# Patient Record
Sex: FEMALE | Race: WHITE | URBAN - NONMETROPOLITAN AREA
[De-identification: names, ages, dates, MRNs, and addresses within clinical notes are randomized per-mention and may not be internally consistent; named-entity substitution may affect disease eponyms.]

---

## 2019-03-19 DIAGNOSIS — D64.9 ANEMIA, UNSPECIFIED TYPE: ICD-10-CM

## 2019-03-19 DIAGNOSIS — R01.1 CARDIAC MURMUR: ICD-10-CM

## 2019-03-19 DIAGNOSIS — J44.9 CHRONIC OBSTRUCTIVE PULMONARY DISEASE, UNSPECIFIED COPD TYPE (HCC): ICD-10-CM

## 2019-03-19 DIAGNOSIS — F41.9 ANXIETY: Primary | ICD-10-CM

## 2019-03-19 DIAGNOSIS — I10 ESSENTIAL HYPERTENSION: ICD-10-CM

## 2019-03-19 DIAGNOSIS — K59.00 CONSTIPATION, UNSPECIFIED CONSTIPATION TYPE: ICD-10-CM

## 2019-03-19 DIAGNOSIS — F32.A DEPRESSION, UNSPECIFIED DEPRESSION TYPE: ICD-10-CM

## 2019-03-19 DIAGNOSIS — R52 PAIN: ICD-10-CM

## 2019-03-19 DIAGNOSIS — R56.9 SEIZURES (HCC): ICD-10-CM

## 2019-03-19 DIAGNOSIS — K21.9 GASTROESOPHAGEAL REFLUX DISEASE WITHOUT ESOPHAGITIS: ICD-10-CM

## 2019-03-19 RX ORDER — POTASSIUM CHLORIDE 750 MG/1
10 TABLET, EXTENDED RELEASE ORAL DAILY
Qty: 180 TABLET | Refills: 0
Start: 2019-03-19

## 2019-03-19 RX ORDER — LEVETIRACETAM 500 MG/1
500 TABLET ORAL 2 TIMES DAILY
Qty: 60 TABLET | Refills: 0
Start: 2019-03-19 | End: 2019-12-02

## 2019-03-19 RX ORDER — HYDROXYZINE HYDROCHLORIDE 10 MG/1
10 TABLET, FILM COATED ORAL DAILY PRN
Qty: 30 TABLET | Refills: 0
Start: 2019-03-19

## 2019-03-19 RX ORDER — IPRATROPIUM BROMIDE AND ALBUTEROL SULFATE 2.5; .5 MG/3ML; MG/3ML
1 SOLUTION RESPIRATORY (INHALATION) EVERY 6 HOURS PRN
Qty: 360 ML | Refills: 0
Start: 2019-03-19 | End: 2019-09-23

## 2019-03-19 RX ORDER — DOCUSATE SODIUM 100 MG/1
200 CAPSULE, LIQUID FILLED ORAL EVERY 8 HOURS PRN
Qty: 60 CAPSULE | Refills: 0
Start: 2019-03-19

## 2019-03-19 RX ORDER — AMLODIPINE BESYLATE 2.5 MG/1
2.5 TABLET ORAL DAILY
Qty: 90 TABLET | Refills: 0
Start: 2019-03-19 | End: 2019-07-10

## 2019-03-19 RX ORDER — SERTRALINE HYDROCHLORIDE 25 MG/1
25 TABLET, FILM COATED ORAL DAILY
Qty: 30 TABLET | Refills: 0
Start: 2019-03-19

## 2019-03-19 RX ORDER — ACETAMINOPHEN 325 MG/1
650 TABLET ORAL EVERY 6 HOURS PRN
Qty: 120 TABLET | Refills: 3
Start: 2019-03-19 | End: 2019-09-23

## 2019-03-19 RX ORDER — FERROUS SULFATE 325(65) MG
325 TABLET ORAL DAILY
Qty: 180 TABLET | Refills: 0
Start: 2019-03-19

## 2019-03-22 ENCOUNTER — OUTSIDE SERVICES (OUTPATIENT)
Dept: FAMILY MEDICINE CLINIC | Age: 84
End: 2019-03-22
Payer: MEDICARE

## 2019-03-22 DIAGNOSIS — F33.1 MODERATE EPISODE OF RECURRENT MAJOR DEPRESSIVE DISORDER (HCC): ICD-10-CM

## 2019-03-22 DIAGNOSIS — M15.9 PRIMARY OSTEOARTHRITIS INVOLVING MULTIPLE JOINTS: ICD-10-CM

## 2019-03-22 DIAGNOSIS — G40.909 SEIZURE DISORDER (HCC): ICD-10-CM

## 2019-03-22 DIAGNOSIS — J42 CHRONIC BRONCHITIS, UNSPECIFIED CHRONIC BRONCHITIS TYPE (HCC): ICD-10-CM

## 2019-03-22 DIAGNOSIS — I10 ESSENTIAL HYPERTENSION: Primary | ICD-10-CM

## 2019-03-22 DIAGNOSIS — F02.80 ALZHEIMER'S DEMENTIA WITHOUT BEHAVIORAL DISTURBANCE, UNSPECIFIED TIMING OF DEMENTIA ONSET: ICD-10-CM

## 2019-03-22 DIAGNOSIS — F41.9 ANXIETY: ICD-10-CM

## 2019-03-22 DIAGNOSIS — K21.9 GASTROESOPHAGEAL REFLUX DISEASE WITHOUT ESOPHAGITIS: ICD-10-CM

## 2019-03-22 DIAGNOSIS — K25.7 CHRONIC GASTRIC ULCER WITHOUT HEMORRHAGE AND WITHOUT PERFORATION: ICD-10-CM

## 2019-03-22 DIAGNOSIS — C67.9 MALIGNANT NEOPLASM OF URINARY BLADDER, UNSPECIFIED SITE (HCC): ICD-10-CM

## 2019-03-22 DIAGNOSIS — G30.9 ALZHEIMER'S DEMENTIA WITHOUT BEHAVIORAL DISTURBANCE, UNSPECIFIED TIMING OF DEMENTIA ONSET: ICD-10-CM

## 2019-03-22 PROCEDURE — 1123F ACP DISCUSS/DSCN MKR DOCD: CPT | Performed by: FAMILY MEDICINE

## 2019-03-22 PROCEDURE — 1101F PT FALLS ASSESS-DOCD LE1/YR: CPT | Performed by: FAMILY MEDICINE

## 2019-03-22 PROCEDURE — 99309 SBSQ NF CARE MODERATE MDM 30: CPT | Performed by: FAMILY MEDICINE

## 2019-03-22 PROCEDURE — G8484 FLU IMMUNIZE NO ADMIN: HCPCS | Performed by: FAMILY MEDICINE

## 2019-03-22 ASSESSMENT — ENCOUNTER SYMPTOMS
CONSTIPATION: 0
VOMITING: 0
BLOOD IN STOOL: 0
EYE DISCHARGE: 0
APNEA: 0
COUGH: 0
RHINORRHEA: 0
SHORTNESS OF BREATH: 0
NAUSEA: 0
DIARRHEA: 0
ABDOMINAL PAIN: 0
EYE REDNESS: 0
COLOR CHANGE: 0
SORE THROAT: 0
WHEEZING: 0
SINUS PRESSURE: 0
BACK PAIN: 0

## 2019-04-08 ENCOUNTER — OUTSIDE SERVICES (OUTPATIENT)
Dept: FAMILY MEDICINE CLINIC | Age: 84
End: 2019-04-08
Payer: MEDICARE

## 2019-04-08 DIAGNOSIS — G40.909 SEIZURE DISORDER (HCC): ICD-10-CM

## 2019-04-08 DIAGNOSIS — F33.1 MODERATE EPISODE OF RECURRENT MAJOR DEPRESSIVE DISORDER (HCC): ICD-10-CM

## 2019-04-08 DIAGNOSIS — J42 CHRONIC BRONCHITIS, UNSPECIFIED CHRONIC BRONCHITIS TYPE (HCC): ICD-10-CM

## 2019-04-08 DIAGNOSIS — M15.9 PRIMARY OSTEOARTHRITIS INVOLVING MULTIPLE JOINTS: ICD-10-CM

## 2019-04-08 DIAGNOSIS — F02.80 ALZHEIMER'S DEMENTIA WITHOUT BEHAVIORAL DISTURBANCE, UNSPECIFIED TIMING OF DEMENTIA ONSET: ICD-10-CM

## 2019-04-08 DIAGNOSIS — I10 ESSENTIAL HYPERTENSION: Primary | ICD-10-CM

## 2019-04-08 DIAGNOSIS — K21.9 GASTROESOPHAGEAL REFLUX DISEASE WITHOUT ESOPHAGITIS: ICD-10-CM

## 2019-04-08 DIAGNOSIS — K25.7 CHRONIC GASTRIC ULCER WITHOUT HEMORRHAGE AND WITHOUT PERFORATION: ICD-10-CM

## 2019-04-08 DIAGNOSIS — C67.9 MALIGNANT NEOPLASM OF URINARY BLADDER, UNSPECIFIED SITE (HCC): ICD-10-CM

## 2019-04-08 DIAGNOSIS — F41.9 ANXIETY: ICD-10-CM

## 2019-04-08 DIAGNOSIS — G30.9 ALZHEIMER'S DEMENTIA WITHOUT BEHAVIORAL DISTURBANCE, UNSPECIFIED TIMING OF DEMENTIA ONSET: ICD-10-CM

## 2019-04-08 PROCEDURE — 99309 SBSQ NF CARE MODERATE MDM 30: CPT | Performed by: NURSE PRACTITIONER

## 2019-04-08 RX ORDER — MIRTAZAPINE 15 MG/1
7.5 TABLET, FILM COATED ORAL NIGHTLY
COMMUNITY
End: 2019-12-02

## 2019-04-08 ASSESSMENT — ENCOUNTER SYMPTOMS
APNEA: 0
VOMITING: 0
COUGH: 0
COLOR CHANGE: 0
ABDOMINAL PAIN: 0
DIARRHEA: 0
EYE DISCHARGE: 0
SINUS PRESSURE: 0
ALLERGIC/IMMUNOLOGIC NEGATIVE: 1
SORE THROAT: 0
BACK PAIN: 0
WHEEZING: 0
NAUSEA: 0
EYE REDNESS: 0
RHINORRHEA: 0
BLOOD IN STOOL: 0
CONSTIPATION: 0
SHORTNESS OF BREATH: 0

## 2019-04-08 NOTE — PROGRESS NOTES
Left total    SALPINGO-OOPHORECTOMY      SMALL INTESTINE SURGERY         Family History   Problem Relation Age of Onset    Diabetes Other     Hypertension Other     Thyroid Disease Other     Cancer Mother     Diabetes type 2  Father        Social History     Tobacco Use    Smoking status: Former Smoker     Types: Cigarettes    Smokeless tobacco: Never Used   Substance Use Topics    Alcohol use: No      Current Outpatient Medications   Medication Sig Dispense Refill    mirtazapine (REMERON) 15 MG tablet Take 7.5 mg by mouth nightly      hydrOXYzine (ATARAX) 10 MG tablet Take 1 tablet by mouth daily as needed for Anxiety (Give 1 tablet orally one time a day related to anxiety) 30 tablet 0    docusate sodium (COLACE) 100 MG capsule Take 2 capsules by mouth every 8 hours as needed for Constipation 60 capsule 0    potassium chloride (KLOR-CON M) 10 MEQ extended release tablet Take 1 tablet by mouth daily 180 tablet 0    levETIRAcetam (KEPPRA) 500 MG tablet Take 1 tablet by mouth 2 times daily 60 tablet 0    ferrous sulfate 325 (65 Fe) MG tablet Take 1 tablet by mouth daily 180 tablet 0    metoprolol tartrate (LOPRESSOR) 25 MG tablet Take 0.5 tablets by mouth 2 times daily 60 tablet 0    acetaminophen (TYLENOL) 325 MG tablet Take 2 tablets by mouth every 6 hours as needed for Pain 120 tablet 3    amLODIPine (NORVASC) 2.5 MG tablet Take 1 tablet by mouth daily 90 tablet 0    ipratropium-albuterol (DUONEB) 0.5-2.5 (3) MG/3ML SOLN nebulizer solution Take 3 mLs by nebulization every 6 hours as needed for Shortness of Breath (Wheezing) 360 mL 0    sertraline (ZOLOFT) 25 MG tablet Take 1 tablet by mouth daily 30 tablet 0    omeprazole (PRILOSEC) 40 MG capsule Take 40 mg by mouth daily.  benazepril (LOTENSIN) 20 MG tablet Take 20 mg by mouth daily. No current facility-administered medications for this visit.       Allergies   Allergen Reactions    Amoxicillin     Sulfa Antibiotics Health Maintenance   Topic Date Due    DTaP/Tdap/Td vaccine (1 - Tdap) 10/10/1942    Shingles Vaccine (1 of 2) 10/10/1973    Pneumococcal 65+ years Vaccine (1 of 2 - PCV13) 10/10/1988    Potassium monitoring  08/03/2015    Creatinine monitoring  08/03/2015    Flu vaccine (Season Ended) 09/01/2019       Subjective:      Review of Systems   Constitutional: Positive for appetite change. Negative for chills, fatigue, fever and unexpected weight change. HENT: Positive for hearing loss. Negative for congestion, postnasal drip, rhinorrhea, sinus pressure, sore throat and tinnitus. Eyes: Positive for visual disturbance. Negative for discharge and redness. Respiratory: Negative for apnea, cough, shortness of breath and wheezing. Cardiovascular: Negative for chest pain, palpitations and leg swelling. Gastrointestinal: Negative for abdominal pain, blood in stool, constipation, diarrhea, nausea and vomiting. Endocrine: Negative for polyuria. Genitourinary: Negative for difficulty urinating, dysuria, frequency, hematuria and urgency. Musculoskeletal: Positive for arthralgias and gait problem. Negative for back pain, myalgias and neck stiffness. Skin: Negative. Negative for color change and rash. Allergic/Immunologic: Negative. Neurological: Negative for dizziness, tremors, seizures, syncope, light-headedness, numbness and headaches. Psychiatric/Behavioral: Positive for confusion. Negative for decreased concentration and sleep disturbance. The patient is not nervous/anxious. Objective:     Physical Exam   Constitutional: She appears well-developed and well-nourished. No distress. HENT:   Head: Normocephalic and atraumatic. Mouth/Throat: Oropharynx is clear and moist.   Eyes: Pupils are equal, round, and reactive to light. Conjunctivae and EOM are normal. Right eye exhibits no discharge. Left eye exhibits no discharge. Neck: Normal range of motion. Neck supple.    Cardiovascular: Normal rate and regular rhythm. Murmur heard. Pulmonary/Chest: Effort normal and breath sounds normal. No respiratory distress. She has no wheezes. She has no rales. Abdominal: Soft. Bowel sounds are normal. She exhibits no distension. There is no tenderness. Musculoskeletal: She exhibits no edema or tenderness. Lymphadenopathy:     She has no cervical adenopathy. Neurological: She is alert. A sensory deficit is present. Gait abnormal. Coordination normal.   Skin: Skin is warm and dry. Capillary refill takes 2 to 3 seconds. Psychiatric: She has a normal mood and affect. Judgment and thought content normal. Her speech is delayed. She is slowed. Cognition and memory are impaired. Nursing note and vitals reviewed. BP: 93/59 mmHg   Temp:97.7 °F   Pulse: 55 bpm   Weight: 114.8 Lbs   Resp: 14 Breaths/min   O2: 92 %    Assessment:       Diagnosis Orders   1. Essential hypertension     2. Chronic gastric ulcer without hemorrhage and without perforation     3. Malignant neoplasm of urinary bladder, unspecified site ([facility] [address]     4. Anxiety     5. Alzheimer's dementia without behavioral disturbance, unspecified timing of dementia onset     6. Chronic bronchitis, unspecified chronic bronchitis type ([address]     7. Gastroesophageal reflux disease without esophagitis     8. Moderate episode of recurrent major depressive disorder ([address]     9. Seizure disorder ([address]     10. Primary osteoarthritis involving multiple joints         Plan:      1. HTN- Chronic and variable. Continue Benazepril, Lopressor, and Norvasc. Continue to monitor blood pressures. 2.  GERD/PUD- Chronic and stable. Continue Omeprazole and monitor. monitor weights. Will dc supplements and add Remeron. 3. Debility secondary to Bladder cancer- F/U with Dr. Rey Hoover Continue to encourage to PO intake. 4.Anxiety with Depression- Chronic and stable. Continue Zoloft and monitor. 5. Dementia-chronic stable, safety precautions.   6. COPD- duonebs as needed. 7. Seizure disorder-Last seizure over a year ago. Continue Keppra. Had F/U with Dr. Mallika Haney as directed. 8. Osteoarthritis- stable, asymptomatic at this time. Monitor for pain.   9. Follow up monthly or prn    Electronically signed by BRENDEN Valerio CNP on 4/8/2019 at 2:39 PM

## 2019-05-10 ENCOUNTER — OUTSIDE SERVICES (OUTPATIENT)
Dept: FAMILY MEDICINE CLINIC | Age: 84
End: 2019-05-10
Payer: MEDICARE

## 2019-05-10 VITALS
TEMPERATURE: 97.5 F | DIASTOLIC BLOOD PRESSURE: 74 MMHG | OXYGEN SATURATION: 95 % | WEIGHT: 116.4 LBS | HEART RATE: 78 BPM | SYSTOLIC BLOOD PRESSURE: 155 MMHG | BODY MASS INDEX: 19.37 KG/M2 | RESPIRATION RATE: 16 BRPM

## 2019-05-10 DIAGNOSIS — F33.1 MODERATE EPISODE OF RECURRENT MAJOR DEPRESSIVE DISORDER (HCC): ICD-10-CM

## 2019-05-10 DIAGNOSIS — F02.80 ALZHEIMER'S DEMENTIA WITHOUT BEHAVIORAL DISTURBANCE, UNSPECIFIED TIMING OF DEMENTIA ONSET: ICD-10-CM

## 2019-05-10 DIAGNOSIS — G30.9 ALZHEIMER'S DEMENTIA WITHOUT BEHAVIORAL DISTURBANCE, UNSPECIFIED TIMING OF DEMENTIA ONSET: ICD-10-CM

## 2019-05-10 DIAGNOSIS — J42 CHRONIC BRONCHITIS, UNSPECIFIED CHRONIC BRONCHITIS TYPE (HCC): ICD-10-CM

## 2019-05-10 DIAGNOSIS — C67.9 MALIGNANT NEOPLASM OF URINARY BLADDER, UNSPECIFIED SITE (HCC): ICD-10-CM

## 2019-05-10 DIAGNOSIS — I10 ESSENTIAL HYPERTENSION: Primary | ICD-10-CM

## 2019-05-10 DIAGNOSIS — K25.7 CHRONIC GASTRIC ULCER WITHOUT HEMORRHAGE AND WITHOUT PERFORATION: ICD-10-CM

## 2019-05-10 DIAGNOSIS — G40.909 SEIZURE DISORDER (HCC): ICD-10-CM

## 2019-05-10 DIAGNOSIS — K21.9 GASTROESOPHAGEAL REFLUX DISEASE WITHOUT ESOPHAGITIS: ICD-10-CM

## 2019-05-10 DIAGNOSIS — F41.9 ANXIETY: ICD-10-CM

## 2019-05-10 DIAGNOSIS — M15.9 PRIMARY OSTEOARTHRITIS INVOLVING MULTIPLE JOINTS: ICD-10-CM

## 2019-05-10 PROBLEM — K59.00 CONSTIPATION: Status: ACTIVE | Noted: 2019-05-10

## 2019-05-10 PROCEDURE — 1123F ACP DISCUSS/DSCN MKR DOCD: CPT | Performed by: FAMILY MEDICINE

## 2019-05-10 PROCEDURE — 99309 SBSQ NF CARE MODERATE MDM 30: CPT | Performed by: FAMILY MEDICINE

## 2019-05-10 ASSESSMENT — ENCOUNTER SYMPTOMS
EYE REDNESS: 0
SORE THROAT: 0
SHORTNESS OF BREATH: 0
ABDOMINAL DISTENTION: 0
CONSTIPATION: 0
WHEEZING: 0
VOMITING: 0
RHINORRHEA: 0
COUGH: 0
NAUSEA: 0
DIARRHEA: 0

## 2019-05-10 NOTE — PROGRESS NOTES
Normal range of motion. She exhibits no edema or deformity. Right shoulder: She exhibits no tenderness, no bony tenderness and no pain. Left shoulder: She exhibits no tenderness, no bony tenderness and no pain. Cervical back: She exhibits no tenderness, no bony tenderness and no pain. Thoracic back: She exhibits no tenderness, no bony tenderness, no pain and no spasm. Lumbar back: She exhibits no tenderness, no bony tenderness and no pain. Lymphadenopathy:     She has no cervical adenopathy. Right: No supraclavicular adenopathy present. Left: No supraclavicular adenopathy present. Neurological: She is alert. She is disoriented. She displays no atrophy. She exhibits normal muscle tone. She displays no seizure activity. Skin: Skin is warm, dry and intact. No rash noted. No erythema. Psychiatric: She has a normal mood and affect. Her speech is delayed. Cognition and memory are impaired. Nursing note and vitals reviewed. BP (!) 155/74   Pulse 78   Temp 97.5 °F (36.4 °C)   Resp 16   Wt 116 lb 6.4 oz (52.8 kg)   SpO2 95%   BMI 19.37 kg/m²     Assessment:       Diagnosis Orders   1. Essential hypertension     2. Chronic gastric ulcer without hemorrhage and without perforation     3. Malignant neoplasm of urinary bladder, unspecified site ([address]     4. Anxiety     5. Alzheimer's dementia without behavioral disturbance, unspecified timing of dementia onset     6. Chronic bronchitis, unspecified chronic bronchitis type ([address]     7. Gastroesophageal reflux disease without esophagitis     8. Moderate episode of recurrent major depressive disorder ([address]     9. Seizure disorder ([address]     10. Primary osteoarthritis involving multiple joints         Plan:     1. HTN- Chronic and variable. Continue Benazepril, Lopressor, and Norvasc. Continue to monitor blood pressures. 2.  GERD/PUD- Chronic and stable. Continue Omeprazole and monitor.  Weights improved with recent addition of Remeron. 3. Debility secondary to Bladder cancer- F/U with Dr. Mellissa Gautam Continue to encourage to PO intake. 4.Anxiety with Depression- Chronic and stable. Continue Zoloft, Remeron, and monitor. 5. Dementia-chronic stable, safety precautions. One fall in the past month. 6. COPD- duonebs as needed. 7. Seizure disorder-Last seizure over a year ago. Continue Keppra. Had F/U with Dr. Dusty Holder as directed. 8. Osteoarthritis- stable, asymptomatic at this time. Monitor for pain.      Electronically signed by Lance Randall MD on 5/10/2019 at 4:09 PM

## 2019-06-10 ENCOUNTER — OUTSIDE SERVICES (OUTPATIENT)
Dept: FAMILY MEDICINE CLINIC | Age: 84
End: 2019-06-10
Payer: MEDICARE

## 2019-06-10 VITALS
SYSTOLIC BLOOD PRESSURE: 124 MMHG | HEART RATE: 79 BPM | RESPIRATION RATE: 14 BRPM | WEIGHT: 115 LBS | DIASTOLIC BLOOD PRESSURE: 61 MMHG | BODY MASS INDEX: 19.14 KG/M2 | OXYGEN SATURATION: 93 % | TEMPERATURE: 97 F

## 2019-06-10 DIAGNOSIS — G40.909 SEIZURE DISORDER (HCC): ICD-10-CM

## 2019-06-10 DIAGNOSIS — C67.9 MALIGNANT NEOPLASM OF URINARY BLADDER, UNSPECIFIED SITE (HCC): ICD-10-CM

## 2019-06-10 DIAGNOSIS — K25.7 CHRONIC GASTRIC ULCER WITHOUT HEMORRHAGE AND WITHOUT PERFORATION: ICD-10-CM

## 2019-06-10 DIAGNOSIS — K21.9 GASTROESOPHAGEAL REFLUX DISEASE WITHOUT ESOPHAGITIS: ICD-10-CM

## 2019-06-10 DIAGNOSIS — I10 ESSENTIAL HYPERTENSION: Primary | ICD-10-CM

## 2019-06-10 DIAGNOSIS — G30.9 ALZHEIMER'S DEMENTIA WITHOUT BEHAVIORAL DISTURBANCE, UNSPECIFIED TIMING OF DEMENTIA ONSET: ICD-10-CM

## 2019-06-10 DIAGNOSIS — K59.00 CONSTIPATION, UNSPECIFIED CONSTIPATION TYPE: ICD-10-CM

## 2019-06-10 DIAGNOSIS — F33.1 MODERATE EPISODE OF RECURRENT MAJOR DEPRESSIVE DISORDER (HCC): ICD-10-CM

## 2019-06-10 DIAGNOSIS — F02.80 ALZHEIMER'S DEMENTIA WITHOUT BEHAVIORAL DISTURBANCE, UNSPECIFIED TIMING OF DEMENTIA ONSET: ICD-10-CM

## 2019-06-10 DIAGNOSIS — F41.9 ANXIETY: ICD-10-CM

## 2019-06-10 DIAGNOSIS — M15.9 PRIMARY OSTEOARTHRITIS INVOLVING MULTIPLE JOINTS: ICD-10-CM

## 2019-06-10 DIAGNOSIS — J42 CHRONIC BRONCHITIS, UNSPECIFIED CHRONIC BRONCHITIS TYPE (HCC): ICD-10-CM

## 2019-06-10 PROCEDURE — 99309 SBSQ NF CARE MODERATE MDM 30: CPT | Performed by: NURSE PRACTITIONER

## 2019-06-10 RX ORDER — ACETAMINOPHEN 325 MG/1
650 TABLET ORAL DAILY
COMMUNITY

## 2019-06-10 ASSESSMENT — ENCOUNTER SYMPTOMS
SORE THROAT: 0
EYE REDNESS: 0
NAUSEA: 0
RHINORRHEA: 0
COUGH: 0
WHEEZING: 0
SHORTNESS OF BREATH: 0
DIARRHEA: 0
VOMITING: 0
CONSTIPATION: 0

## 2019-06-10 NOTE — PROGRESS NOTES
Randell Morales is a 80 y.o. female who presents today for her medical conditions/complaints as noted below. Chief Complaint   Patient presents with    1 Month Follow-Up     follow up on chronic health conditions           HPI:     Marvin Escalante is seen in her room. She denies any complaints. She is up sitting in a bedside chair. She had a UA on 5/21 but it did not grow anything. No recent falls. Weight has been overall stable. She did have a room move in the last month and has done well with this.      Past Medical History:   Diagnosis Date    Anemia, unspecified     Anxiety disorder, unspecified     Cardiac murmur, unspecified     Chronic obstructive pulmonary disease (COPD) (HCC)     Difficulty in walking, not elsewhere classified     Essential (primary) hypertension     Gastro-esophageal reflux disease without esophagitis     Hypertension     Major depressive disorder, recurrent ([address]     Malignant neoplasm of bladder (HCC)     Muscle weakness (generalized)     Other myocardial infarction type ([address]     Other seizures ([address]     Personal history of peptic ulcer disease     Pneumonia, unspecified organism     Primary generalized (osteo)arthritis     Retention of urine, unspecified     Stomach ulcer     Urinary tract infection, site not specified       Past Surgical History:   Procedure Laterality Date    APPENDECTOMY      BLADDER TUMOR EXCISION      Bladder tumor resection    COLONOSCOPY      HYSTERECTOMY      MIDDLE EAR SURGERY Left     PAROTIDECTOMY  5/15/10    Left total    SALPINGO-OOPHORECTOMY      SMALL INTESTINE SURGERY         Family History   Problem Relation Age of Onset    Diabetes Other     Hypertension Other     Thyroid Disease Other     Cancer Mother     Diabetes type 2  Father        Social History     Tobacco Use    Smoking status: Former Smoker     Types: Cigarettes    Smokeless tobacco: Never Used   Substance Use Topics    Alcohol use: No      Current Outpatient Constitutional: Negative for chills, fatigue, fever and unexpected weight change. HENT: Positive for hearing loss. Negative for congestion, rhinorrhea and sore throat. Eyes: Negative for redness and visual disturbance. Respiratory: Negative for cough, shortness of breath and wheezing. Cardiovascular: Negative for chest pain and leg swelling. Gastrointestinal: Negative for constipation, diarrhea, nausea and vomiting. Endocrine: Negative for polydipsia and polyuria. Genitourinary: Negative for dysuria, frequency and hematuria. Musculoskeletal: Positive for gait problem. Negative for arthralgias and myalgias. Skin: Negative for rash and wound. Allergic/Immunologic: Negative for environmental allergies and immunocompromised state. Neurological: Positive for weakness. Negative for dizziness, light-headedness and headaches. Hematological: Does not bruise/bleed easily. Psychiatric/Behavioral: Positive for confusion. Negative for dysphoric mood and sleep disturbance. The patient is nervous/anxious. Objective:     Physical Exam   Constitutional: She appears well-developed and well-nourished. No distress. HENT:   Head: Normocephalic and atraumatic. Right Ear: External ear normal.   Left Ear: External ear normal.   Nose: Nose normal.   Mouth/Throat: Oropharynx is clear and moist and mucous membranes are normal.   Eyes: Conjunctivae and EOM are normal. Right eye exhibits no discharge. Left eye exhibits no discharge. No scleral icterus. Neck: Neck supple. No JVD present. No thyromegaly present. Cardiovascular: Normal rate, regular rhythm, normal heart sounds and intact distal pulses. Pulmonary/Chest: Effort normal. No stridor. No respiratory distress. She has decreased breath sounds. She has no wheezes. She has no rales. Abdominal: Soft. Bowel sounds are normal. She exhibits no distension. There is no tenderness. Musculoskeletal: Normal range of motion.  She exhibits no edema or deformity. Right shoulder: She exhibits no tenderness, no bony tenderness and no pain. Left shoulder: She exhibits no tenderness, no bony tenderness and no pain. Cervical back: She exhibits no tenderness, no bony tenderness and no pain. Thoracic back: She exhibits no tenderness, no bony tenderness, no pain and no spasm. Lumbar back: She exhibits no tenderness, no bony tenderness and no pain. Lymphadenopathy:     She has no cervical adenopathy. Right: No supraclavicular adenopathy present. Left: No supraclavicular adenopathy present. Neurological: She is alert. She is disoriented. She displays no atrophy. She exhibits normal muscle tone. She displays no seizure activity. Skin: Skin is warm, dry and intact. No rash noted. No erythema. Psychiatric: Her behavior is normal. Her mood appears anxious. Her speech is delayed. Cognition and memory are impaired. Nursing note and vitals reviewed. /61   Pulse 79   Temp 97 °F (36.1 °C)   Resp 14   Wt 115 lb (52.2 kg)   SpO2 93%   BMI 19.14 kg/m²     Assessment:       Diagnosis Orders   1. Essential hypertension     2. Chronic gastric ulcer without hemorrhage and without perforation     3. Malignant neoplasm of urinary bladder, unspecified site ([address]     4. Anxiety     5. Alzheimer's dementia without behavioral disturbance, unspecified timing of dementia onset     6. Chronic bronchitis, unspecified chronic bronchitis type ([address]     7. Gastroesophageal reflux disease without esophagitis     8. Moderate episode of recurrent major depressive disorder ([address]     9. Seizure disorder ([address]     10. Primary osteoarthritis involving multiple joints     11. Constipation, unspecified constipation type         Plan:     1. HTN- Chronic and variable. Continue Benazepril, Lopressor, and Norvasc. Continue to monitor blood pressures. 2.  GERD/PUD- Chronic and stable. Continue Omeprazole and monitor.  Weights remain stable in

## 2019-07-10 ENCOUNTER — OUTSIDE SERVICES (OUTPATIENT)
Dept: FAMILY MEDICINE CLINIC | Age: 84
End: 2019-07-10
Payer: MEDICARE

## 2019-07-10 VITALS
DIASTOLIC BLOOD PRESSURE: 56 MMHG | RESPIRATION RATE: 16 BRPM | OXYGEN SATURATION: 96 % | WEIGHT: 114.5 LBS | TEMPERATURE: 98 F | SYSTOLIC BLOOD PRESSURE: 107 MMHG | BODY MASS INDEX: 19.05 KG/M2 | HEART RATE: 58 BPM

## 2019-07-10 DIAGNOSIS — F41.9 ANXIETY: ICD-10-CM

## 2019-07-10 DIAGNOSIS — K25.7 CHRONIC GASTRIC ULCER WITHOUT HEMORRHAGE AND WITHOUT PERFORATION: ICD-10-CM

## 2019-07-10 DIAGNOSIS — J42 CHRONIC BRONCHITIS, UNSPECIFIED CHRONIC BRONCHITIS TYPE (HCC): ICD-10-CM

## 2019-07-10 DIAGNOSIS — M15.9 PRIMARY OSTEOARTHRITIS INVOLVING MULTIPLE JOINTS: ICD-10-CM

## 2019-07-10 DIAGNOSIS — I10 ESSENTIAL HYPERTENSION: Primary | ICD-10-CM

## 2019-07-10 DIAGNOSIS — F02.80 ALZHEIMER'S DEMENTIA WITHOUT BEHAVIORAL DISTURBANCE, UNSPECIFIED TIMING OF DEMENTIA ONSET: ICD-10-CM

## 2019-07-10 DIAGNOSIS — G40.909 SEIZURE DISORDER (HCC): ICD-10-CM

## 2019-07-10 DIAGNOSIS — G30.9 ALZHEIMER'S DEMENTIA WITHOUT BEHAVIORAL DISTURBANCE, UNSPECIFIED TIMING OF DEMENTIA ONSET: ICD-10-CM

## 2019-07-10 DIAGNOSIS — C67.9 MALIGNANT NEOPLASM OF URINARY BLADDER, UNSPECIFIED SITE (HCC): ICD-10-CM

## 2019-07-10 DIAGNOSIS — K21.9 GASTROESOPHAGEAL REFLUX DISEASE WITHOUT ESOPHAGITIS: ICD-10-CM

## 2019-07-10 PROBLEM — D64.9 ANEMIA: Status: ACTIVE | Noted: 2019-07-10

## 2019-07-10 PROCEDURE — 99309 SBSQ NF CARE MODERATE MDM 30: CPT | Performed by: NURSE PRACTITIONER

## 2019-07-10 ASSESSMENT — ENCOUNTER SYMPTOMS
CONSTIPATION: 0
COUGH: 0
VOMITING: 0
RHINORRHEA: 0
SHORTNESS OF BREATH: 0
WHEEZING: 0
EYE REDNESS: 0
NAUSEA: 0
SORE THROAT: 0
DIARRHEA: 0

## 2019-07-10 NOTE — PROGRESS NOTES
tablet Take 650 mg by mouth 2 times daily      mirtazapine (REMERON) 15 MG tablet Take 7.5 mg by mouth nightly      hydrOXYzine (ATARAX) 10 MG tablet Take 1 tablet by mouth daily as needed for Anxiety (Give 1 tablet orally one time a day related to anxiety) 30 tablet 0    docusate sodium (COLACE) 100 MG capsule Take 2 capsules by mouth every 8 hours as needed for Constipation 60 capsule 0    potassium chloride (KLOR-CON M) 10 MEQ extended release tablet Take 1 tablet by mouth daily 180 tablet 0    levETIRAcetam (KEPPRA) 500 MG tablet Take 1 tablet by mouth 2 times daily 60 tablet 0    ferrous sulfate 325 (65 Fe) MG tablet Take 1 tablet by mouth daily 180 tablet 0    metoprolol tartrate (LOPRESSOR) 25 MG tablet Take 0.5 tablets by mouth 2 times daily 60 tablet 0    acetaminophen (TYLENOL) 325 MG tablet Take 2 tablets by mouth every 6 hours as needed for Pain 120 tablet 3    ipratropium-albuterol (DUONEB) 0.5-2.5 (3) MG/3ML SOLN nebulizer solution Take 3 mLs by nebulization every 6 hours as needed for Shortness of Breath (Wheezing) 360 mL 0    sertraline (ZOLOFT) 25 MG tablet Take 1 tablet by mouth daily 30 tablet 0    omeprazole (PRILOSEC) 40 MG capsule Take 40 mg by mouth daily.  benazepril (LOTENSIN) 20 MG tablet Take 20 mg by mouth daily. No current facility-administered medications for this visit. Allergies   Allergen Reactions    Amoxicillin     Sulfa Antibiotics        Health Maintenance   Topic Date Due    DTaP/Tdap/Td vaccine (1 - Tdap) 10/10/1942    Shingles Vaccine (1 of 2) 10/10/1973    Pneumococcal 65+ years Vaccine (1 of 2 - PCV13) 10/10/1988    Potassium monitoring  08/03/2015    Creatinine monitoring  08/03/2015    Flu vaccine (1) 09/01/2019       Subjective:      Review of Systems   Constitutional: Negative for chills, fatigue, fever and unexpected weight change. HENT: Positive for hearing loss. Negative for congestion, rhinorrhea and sore throat.     Eyes: Negative for redness and visual disturbance. Respiratory: Negative for cough, shortness of breath and wheezing. Cardiovascular: Negative for chest pain and leg swelling. Gastrointestinal: Negative for constipation, diarrhea, nausea and vomiting. Endocrine: Negative for polydipsia and polyuria. Genitourinary: Negative for dysuria, frequency and hematuria. Musculoskeletal: Positive for gait problem. Negative for arthralgias and myalgias. Skin: Negative for rash and wound. Allergic/Immunologic: Negative for environmental allergies and immunocompromised state. Neurological: Positive for weakness. Negative for dizziness, light-headedness and headaches. Hematological: Does not bruise/bleed easily. Psychiatric/Behavioral: Positive for confusion. Negative for dysphoric mood and sleep disturbance. The patient is nervous/anxious. Objective:     Physical Exam   Constitutional: She appears well-developed and well-nourished. No distress. HENT:   Head: Normocephalic and atraumatic. Right Ear: External ear normal.   Left Ear: External ear normal.   Nose: Nose normal.   Mouth/Throat: Oropharynx is clear and moist and mucous membranes are normal.   Eyes: Conjunctivae and EOM are normal. Right eye exhibits no discharge. Left eye exhibits no discharge. No scleral icterus. Neck: Neck supple. No JVD present. No thyromegaly present. Cardiovascular: Normal rate, regular rhythm, normal heart sounds and intact distal pulses. Pulmonary/Chest: Effort normal. No stridor. No respiratory distress. She has decreased breath sounds. She has no wheezes. She has no rales. Abdominal: Soft. Bowel sounds are normal. She exhibits no distension. There is no tenderness. Musculoskeletal: Normal range of motion. She exhibits no edema or deformity. Right shoulder: She exhibits no tenderness, no bony tenderness and no pain. Left shoulder: She exhibits no tenderness, no bony tenderness and no pain. Cervical back: She exhibits no tenderness, no bony tenderness and no pain. Thoracic back: She exhibits no tenderness, no bony tenderness, no pain and no spasm. Lumbar back: She exhibits no tenderness, no bony tenderness and no pain. Lymphadenopathy:     She has no cervical adenopathy. Right: No supraclavicular adenopathy present. Left: No supraclavicular adenopathy present. Neurological: She is alert. She is disoriented. She displays no atrophy. She exhibits normal muscle tone. She displays no seizure activity. Skin: Skin is warm, dry and intact. No rash noted. No erythema. Psychiatric: Her behavior is normal. Her mood appears anxious. Her speech is delayed. Cognition and memory are impaired. Nursing note and vitals reviewed. BP (!) 107/56   Pulse 58   Temp 98 °F (36.7 °C)   Resp 16   Wt 114 lb 8 oz (51.9 kg)   SpO2 96%   BMI 19.05 kg/m²     Assessment:       Diagnosis Orders   1. Essential hypertension     2. Chronic gastric ulcer without hemorrhage and without perforation     3. Malignant neoplasm of urinary bladder, unspecified site ([address]     4. Anxiety     5. Alzheimer's dementia without behavioral disturbance, unspecified timing of dementia onset     6. Chronic bronchitis, unspecified chronic bronchitis type ([address]     7. Gastroesophageal reflux disease without esophagitis     8. Seizure disorder ([address]     9. Primary osteoarthritis involving multiple joints         Plan:     1. HTN- Chronic and variable. Continue Benazepril and Lopressor. She has had lower blood pressures and will dc Norvasc and add parameters for Lopressor. 2.  GERD/PUD- Chronic and stable. Continue Omeprazole and monitor. Weights remain stable in the past month. 3. Debility secondary to Bladder cancer- F/U with Dr. Kavin Edwards Continue to encourage to PO intake. 4.Anxiety with Depression- Chronic and stable. Continue Zoloft, Remeron, and monitor. 5. Dementia-chronic stable, safety precautions.

## 2019-08-15 ENCOUNTER — OUTSIDE SERVICES (OUTPATIENT)
Dept: FAMILY MEDICINE CLINIC | Age: 84
End: 2019-08-15

## 2019-08-15 VITALS
TEMPERATURE: 97.4 F | OXYGEN SATURATION: 97 % | DIASTOLIC BLOOD PRESSURE: 57 MMHG | BODY MASS INDEX: 19.19 KG/M2 | RESPIRATION RATE: 16 BRPM | SYSTOLIC BLOOD PRESSURE: 122 MMHG | WEIGHT: 115.3 LBS | HEART RATE: 57 BPM

## 2019-08-15 DIAGNOSIS — F33.1 MODERATE EPISODE OF RECURRENT MAJOR DEPRESSIVE DISORDER (HCC): ICD-10-CM

## 2019-08-15 DIAGNOSIS — M15.9 PRIMARY OSTEOARTHRITIS INVOLVING MULTIPLE JOINTS: ICD-10-CM

## 2019-08-15 DIAGNOSIS — K25.7 CHRONIC GASTRIC ULCER WITHOUT HEMORRHAGE AND WITHOUT PERFORATION: ICD-10-CM

## 2019-08-15 DIAGNOSIS — F02.80 ALZHEIMER'S DEMENTIA WITHOUT BEHAVIORAL DISTURBANCE, UNSPECIFIED TIMING OF DEMENTIA ONSET: ICD-10-CM

## 2019-08-15 DIAGNOSIS — D64.9 ANEMIA, UNSPECIFIED TYPE: ICD-10-CM

## 2019-08-15 DIAGNOSIS — C67.9 MALIGNANT NEOPLASM OF URINARY BLADDER, UNSPECIFIED SITE (HCC): ICD-10-CM

## 2019-08-15 DIAGNOSIS — G30.9 ALZHEIMER'S DEMENTIA WITHOUT BEHAVIORAL DISTURBANCE, UNSPECIFIED TIMING OF DEMENTIA ONSET: ICD-10-CM

## 2019-08-15 DIAGNOSIS — G40.909 SEIZURE DISORDER (HCC): ICD-10-CM

## 2019-08-15 DIAGNOSIS — J42 CHRONIC BRONCHITIS, UNSPECIFIED CHRONIC BRONCHITIS TYPE (HCC): ICD-10-CM

## 2019-08-15 DIAGNOSIS — I10 ESSENTIAL HYPERTENSION: Primary | ICD-10-CM

## 2019-08-15 ASSESSMENT — ENCOUNTER SYMPTOMS
WHEEZING: 0
DIARRHEA: 0
EYE REDNESS: 0
VOMITING: 0
CONSTIPATION: 0
SHORTNESS OF BREATH: 0
NAUSEA: 0
SORE THROAT: 0
COUGH: 0
RHINORRHEA: 0

## 2019-08-15 NOTE — PROGRESS NOTES
Chapis Veliz is a 80 y.o. female who presents today for her medical conditions/complaints as noted below. Chief Complaint   Patient presents with    1 Month Follow-Up     for chronic health conditions           HPI:     Deepthi Lemon is seen and examined today for her monthly visit. She is sitting in her recliner watching television. She is very pleasant and smiling. She denies needs at this time. Nursing staff have no new concerns.        Past Medical History:   Diagnosis Date    Anemia, unspecified     Anxiety disorder, unspecified     Cardiac murmur, unspecified     Chronic obstructive pulmonary disease (COPD) (HCC)     Difficulty in walking, not elsewhere classified     Essential (primary) hypertension     Gastro-esophageal reflux disease without esophagitis     Hypertension     Major depressive disorder, recurrent ([address]     Malignant neoplasm of bladder (HCC)     Muscle weakness (generalized)     Other myocardial infarction type ([address]     Other seizures ([address]     Personal history of peptic ulcer disease     Pneumonia, unspecified organism     Primary generalized (osteo)arthritis     Retention of urine, unspecified     Stomach ulcer     Urinary tract infection, site not specified       Past Surgical History:   Procedure Laterality Date    APPENDECTOMY      BLADDER TUMOR EXCISION      Bladder tumor resection    COLONOSCOPY      HYSTERECTOMY      MIDDLE EAR SURGERY Left     PAROTIDECTOMY  5/15/10    Left total    SALPINGO-OOPHORECTOMY      SMALL INTESTINE SURGERY         Family History   Problem Relation Age of Onset    Diabetes Other     Hypertension Other     Thyroid Disease Other     Cancer Mother     Diabetes type 2  Father        Social History     Tobacco Use    Smoking status: Former Smoker     Types: Cigarettes    Smokeless tobacco: Never Used   Substance Use Topics    Alcohol use: No      Current Outpatient Medications   Medication Sig Dispense Refill    acetaminophen Dementia-chronic stable, safety precautions. No falls. 6. COPD- duonebs as needed. 7. Seizure disorder-Last seizure over a year ago. Continue Keppra. Follows with Dr. Jaime Walker. Keppra level stable. 8. Osteoarthritis- stable, asymptomatic at this time. Monitor for pain.      Electronically signed by BRENDEN Patton CNP on 8/15/2019 at 4:28 PM

## 2019-09-23 ENCOUNTER — OUTSIDE SERVICES (OUTPATIENT)
Dept: FAMILY MEDICINE CLINIC | Age: 84
End: 2019-09-23
Payer: MEDICARE

## 2019-09-23 VITALS
WEIGHT: 113.2 LBS | TEMPERATURE: 97.5 F | DIASTOLIC BLOOD PRESSURE: 58 MMHG | SYSTOLIC BLOOD PRESSURE: 125 MMHG | OXYGEN SATURATION: 93 % | HEART RATE: 79 BPM | BODY MASS INDEX: 18.84 KG/M2 | RESPIRATION RATE: 16 BRPM

## 2019-09-23 DIAGNOSIS — G40.909 SEIZURE DISORDER (HCC): ICD-10-CM

## 2019-09-23 DIAGNOSIS — M15.9 PRIMARY OSTEOARTHRITIS INVOLVING MULTIPLE JOINTS: ICD-10-CM

## 2019-09-23 DIAGNOSIS — G30.9 ALZHEIMER'S DEMENTIA WITHOUT BEHAVIORAL DISTURBANCE, UNSPECIFIED TIMING OF DEMENTIA ONSET: ICD-10-CM

## 2019-09-23 DIAGNOSIS — K25.7 CHRONIC GASTRIC ULCER WITHOUT HEMORRHAGE AND WITHOUT PERFORATION: ICD-10-CM

## 2019-09-23 DIAGNOSIS — C67.9 MALIGNANT NEOPLASM OF URINARY BLADDER, UNSPECIFIED SITE (HCC): ICD-10-CM

## 2019-09-23 DIAGNOSIS — F02.80 ALZHEIMER'S DEMENTIA WITHOUT BEHAVIORAL DISTURBANCE, UNSPECIFIED TIMING OF DEMENTIA ONSET: ICD-10-CM

## 2019-09-23 DIAGNOSIS — F33.1 MODERATE EPISODE OF RECURRENT MAJOR DEPRESSIVE DISORDER (HCC): ICD-10-CM

## 2019-09-23 DIAGNOSIS — D64.9 ANEMIA, UNSPECIFIED TYPE: ICD-10-CM

## 2019-09-23 DIAGNOSIS — I10 ESSENTIAL HYPERTENSION: Primary | ICD-10-CM

## 2019-09-23 PROCEDURE — 99309 SBSQ NF CARE MODERATE MDM 30: CPT | Performed by: NURSE PRACTITIONER

## 2019-09-23 ASSESSMENT — ENCOUNTER SYMPTOMS
RHINORRHEA: 0
SORE THROAT: 0
EYE REDNESS: 0
VOMITING: 0
CONSTIPATION: 0
DIARRHEA: 0
NAUSEA: 0
COUGH: 0
SHORTNESS OF BREATH: 0
WHEEZING: 0

## 2019-09-23 NOTE — PROGRESS NOTES
acetaminophen (TYLENOL) 325 MG tablet Take 650 mg by mouth 2 times daily      mirtazapine (REMERON) 15 MG tablet Take 7.5 mg by mouth nightly      hydrOXYzine (ATARAX) 10 MG tablet Take 1 tablet by mouth daily as needed for Anxiety (Give 1 tablet orally one time a day related to anxiety) 30 tablet 0    docusate sodium (COLACE) 100 MG capsule Take 2 capsules by mouth every 8 hours as needed for Constipation 60 capsule 0    potassium chloride (KLOR-CON M) 10 MEQ extended release tablet Take 1 tablet by mouth daily 180 tablet 0    levETIRAcetam (KEPPRA) 500 MG tablet Take 1 tablet by mouth 2 times daily 60 tablet 0    ferrous sulfate 325 (65 Fe) MG tablet Take 1 tablet by mouth daily 180 tablet 0    metoprolol tartrate (LOPRESSOR) 25 MG tablet Take 0.5 tablets by mouth 2 times daily 60 tablet 0    sertraline (ZOLOFT) 25 MG tablet Take 1 tablet by mouth daily 30 tablet 0    omeprazole (PRILOSEC) 40 MG capsule Take 40 mg by mouth daily.  benazepril (LOTENSIN) 20 MG tablet Take 20 mg by mouth daily. No current facility-administered medications for this visit. Allergies   Allergen Reactions    Amoxicillin     Sulfa Antibiotics        Health Maintenance   Topic Date Due    DTaP/Tdap/Td vaccine (1 - Tdap) 10/10/1942    Shingles Vaccine (1 of 2) 10/10/1973    Pneumococcal 65+ years Vaccine (1 of 2 - PCV13) 10/10/1988    Potassium monitoring  08/03/2015    Creatinine monitoring  08/03/2015    Flu vaccine (1) 09/01/2019       Subjective:      Review of Systems   Constitutional: Negative for chills, fatigue, fever and unexpected weight change. HENT: Positive for hearing loss. Negative for congestion, rhinorrhea and sore throat. Eyes: Negative for redness and visual disturbance. Respiratory: Negative for cough, shortness of breath and wheezing. Cardiovascular: Negative for chest pain and leg swelling. Gastrointestinal: Negative for constipation, diarrhea, nausea and vomiting. BRENDEN - CNP on 9/23/2019 at 11:58 AM

## 2019-11-01 ENCOUNTER — OUTSIDE SERVICES (OUTPATIENT)
Dept: FAMILY MEDICINE CLINIC | Age: 84
End: 2019-11-01
Payer: MEDICARE

## 2019-11-01 VITALS
RESPIRATION RATE: 16 BRPM | TEMPERATURE: 97.7 F | HEART RATE: 68 BPM | BODY MASS INDEX: 18.99 KG/M2 | DIASTOLIC BLOOD PRESSURE: 63 MMHG | SYSTOLIC BLOOD PRESSURE: 146 MMHG | OXYGEN SATURATION: 96 % | WEIGHT: 114.1 LBS

## 2019-11-01 DIAGNOSIS — C67.9 MALIGNANT NEOPLASM OF URINARY BLADDER, UNSPECIFIED SITE (HCC): ICD-10-CM

## 2019-11-01 DIAGNOSIS — F33.1 MODERATE EPISODE OF RECURRENT MAJOR DEPRESSIVE DISORDER (HCC): ICD-10-CM

## 2019-11-01 DIAGNOSIS — G30.9 ALZHEIMER'S DEMENTIA WITHOUT BEHAVIORAL DISTURBANCE, UNSPECIFIED TIMING OF DEMENTIA ONSET: ICD-10-CM

## 2019-11-01 DIAGNOSIS — K25.7 CHRONIC GASTRIC ULCER WITHOUT HEMORRHAGE AND WITHOUT PERFORATION: ICD-10-CM

## 2019-11-01 DIAGNOSIS — F02.80 ALZHEIMER'S DEMENTIA WITHOUT BEHAVIORAL DISTURBANCE, UNSPECIFIED TIMING OF DEMENTIA ONSET: ICD-10-CM

## 2019-11-01 DIAGNOSIS — M15.9 PRIMARY OSTEOARTHRITIS INVOLVING MULTIPLE JOINTS: ICD-10-CM

## 2019-11-01 DIAGNOSIS — D64.9 ANEMIA, UNSPECIFIED TYPE: ICD-10-CM

## 2019-11-01 DIAGNOSIS — G40.909 SEIZURE DISORDER (HCC): ICD-10-CM

## 2019-11-01 DIAGNOSIS — I10 ESSENTIAL HYPERTENSION: Primary | ICD-10-CM

## 2019-11-01 PROCEDURE — 99309 SBSQ NF CARE MODERATE MDM 30: CPT | Performed by: FAMILY MEDICINE

## 2019-11-01 ASSESSMENT — ENCOUNTER SYMPTOMS
DIARRHEA: 0
RHINORRHEA: 0
WHEEZING: 0
CONSTIPATION: 0
VOMITING: 0
SORE THROAT: 0
SHORTNESS OF BREATH: 0
COUGH: 0
EYE REDNESS: 0
NAUSEA: 0

## 2019-12-02 ENCOUNTER — OUTSIDE SERVICES (OUTPATIENT)
Dept: FAMILY MEDICINE CLINIC | Age: 84
End: 2019-12-02
Payer: MEDICARE

## 2019-12-02 VITALS
OXYGEN SATURATION: 91 % | TEMPERATURE: 97.6 F | BODY MASS INDEX: 18.64 KG/M2 | RESPIRATION RATE: 16 BRPM | HEART RATE: 62 BPM | DIASTOLIC BLOOD PRESSURE: 66 MMHG | SYSTOLIC BLOOD PRESSURE: 148 MMHG | WEIGHT: 112 LBS

## 2019-12-02 DIAGNOSIS — D64.9 ANEMIA, UNSPECIFIED TYPE: ICD-10-CM

## 2019-12-02 DIAGNOSIS — F33.1 MODERATE EPISODE OF RECURRENT MAJOR DEPRESSIVE DISORDER (HCC): ICD-10-CM

## 2019-12-02 DIAGNOSIS — G30.9 ALZHEIMER'S DEMENTIA WITHOUT BEHAVIORAL DISTURBANCE, UNSPECIFIED TIMING OF DEMENTIA ONSET: ICD-10-CM

## 2019-12-02 DIAGNOSIS — I10 ESSENTIAL HYPERTENSION: Primary | ICD-10-CM

## 2019-12-02 DIAGNOSIS — F02.80 ALZHEIMER'S DEMENTIA WITHOUT BEHAVIORAL DISTURBANCE, UNSPECIFIED TIMING OF DEMENTIA ONSET: ICD-10-CM

## 2019-12-02 DIAGNOSIS — C67.9 MALIGNANT NEOPLASM OF URINARY BLADDER, UNSPECIFIED SITE (HCC): ICD-10-CM

## 2019-12-02 DIAGNOSIS — G40.909 SEIZURE DISORDER (HCC): ICD-10-CM

## 2019-12-02 DIAGNOSIS — M15.9 PRIMARY OSTEOARTHRITIS INVOLVING MULTIPLE JOINTS: ICD-10-CM

## 2019-12-02 DIAGNOSIS — K25.7 CHRONIC GASTRIC ULCER WITHOUT HEMORRHAGE AND WITHOUT PERFORATION: ICD-10-CM

## 2019-12-02 PROCEDURE — 99309 SBSQ NF CARE MODERATE MDM 30: CPT | Performed by: NURSE PRACTITIONER

## 2019-12-02 RX ORDER — LEVETIRACETAM 500 MG/1
1000 TABLET, EXTENDED RELEASE ORAL DAILY
COMMUNITY

## 2019-12-02 RX ORDER — METOPROLOL SUCCINATE 25 MG/1
25 TABLET, EXTENDED RELEASE ORAL DAILY
COMMUNITY

## 2019-12-02 RX ORDER — GUAIFENESIN/DEXTROMETHORPHAN 100-10MG/5
10 SYRUP ORAL EVERY 4 HOURS PRN
COMMUNITY

## 2019-12-02 ASSESSMENT — ENCOUNTER SYMPTOMS: BACK PAIN: 0

## 2020-01-01 ENCOUNTER — OUTSIDE SERVICES (OUTPATIENT)
Dept: FAMILY MEDICINE CLINIC | Age: 85
End: 2020-01-01
Payer: MEDICARE

## 2020-01-01 VITALS
OXYGEN SATURATION: 96 % | BODY MASS INDEX: 18.54 KG/M2 | WEIGHT: 111.4 LBS | SYSTOLIC BLOOD PRESSURE: 176 MMHG | RESPIRATION RATE: 16 BRPM | TEMPERATURE: 97.2 F | HEART RATE: 90 BPM | DIASTOLIC BLOOD PRESSURE: 93 MMHG

## 2020-01-01 VITALS
HEART RATE: 91 BPM | DIASTOLIC BLOOD PRESSURE: 83 MMHG | SYSTOLIC BLOOD PRESSURE: 126 MMHG | RESPIRATION RATE: 16 BRPM | OXYGEN SATURATION: 95 % | TEMPERATURE: 97.6 F | WEIGHT: 116.8 LBS | BODY MASS INDEX: 19.44 KG/M2

## 2020-01-01 VITALS
TEMPERATURE: 98.3 F | HEART RATE: 65 BPM | RESPIRATION RATE: 16 BRPM | DIASTOLIC BLOOD PRESSURE: 58 MMHG | BODY MASS INDEX: 18.47 KG/M2 | WEIGHT: 111 LBS | OXYGEN SATURATION: 96 % | SYSTOLIC BLOOD PRESSURE: 123 MMHG

## 2020-01-01 VITALS
BODY MASS INDEX: 19 KG/M2 | DIASTOLIC BLOOD PRESSURE: 76 MMHG | OXYGEN SATURATION: 93 % | RESPIRATION RATE: 16 BRPM | HEART RATE: 81 BPM | TEMPERATURE: 98 F | WEIGHT: 114.2 LBS | SYSTOLIC BLOOD PRESSURE: 155 MMHG

## 2020-01-01 VITALS
TEMPERATURE: 98.3 F | OXYGEN SATURATION: 94 % | WEIGHT: 114 LBS | SYSTOLIC BLOOD PRESSURE: 150 MMHG | HEART RATE: 98 BPM | BODY MASS INDEX: 18.97 KG/M2 | RESPIRATION RATE: 16 BRPM | DIASTOLIC BLOOD PRESSURE: 90 MMHG

## 2020-01-01 PROCEDURE — 99490 CHRNC CARE MGMT STAFF 1ST 20: CPT | Performed by: FAMILY MEDICINE

## 2020-01-01 PROCEDURE — 99309 SBSQ NF CARE MODERATE MDM 30: CPT | Performed by: FAMILY MEDICINE

## 2020-01-01 PROCEDURE — 99309 SBSQ NF CARE MODERATE MDM 30: CPT | Performed by: NURSE PRACTITIONER

## 2020-01-01 PROCEDURE — 99490 CHRNC CARE MGMT STAFF 1ST 20: CPT | Performed by: NURSE PRACTITIONER

## 2020-01-01 ASSESSMENT — ENCOUNTER SYMPTOMS: SHORTNESS OF BREATH: 0

## 2020-01-03 ENCOUNTER — OUTSIDE SERVICES (OUTPATIENT)
Dept: FAMILY MEDICINE CLINIC | Age: 85
End: 2020-01-03
Payer: MEDICARE

## 2020-01-03 VITALS
OXYGEN SATURATION: 96 % | SYSTOLIC BLOOD PRESSURE: 129 MMHG | HEART RATE: 68 BPM | DIASTOLIC BLOOD PRESSURE: 58 MMHG | TEMPERATURE: 98 F | WEIGHT: 118.2 LBS | RESPIRATION RATE: 16 BRPM | BODY MASS INDEX: 19.67 KG/M2

## 2020-01-03 PROCEDURE — 99309 SBSQ NF CARE MODERATE MDM 30: CPT | Performed by: FAMILY MEDICINE

## 2020-01-03 ASSESSMENT — ENCOUNTER SYMPTOMS: BACK PAIN: 0

## 2020-01-03 NOTE — PROGRESS NOTES
Jose Alejandro York is a 80 y.o. female who presents today for her medical conditions/complaints as noted below. Chief Complaint   Patient presents with    1 Month Follow-Up    Hypertension           HPI:     Karen Mora is seen in her room this am. She denies complaints. She has had a 6 pound weight gain this month, but her appetite does fluctuate. She had had no recent falls or labs. Labs are due this month. Will add a Keppra and B12 level. She is smiling and eating breakfast. Has baseline dementia and does at times refuse medications. Will decrease her omeprazole to 20 mg daily and monitor signs and symptoms.        Past Medical History:   Diagnosis Date    Anemia, unspecified     Anxiety disorder, unspecified     Cardiac murmur, unspecified     Chronic obstructive pulmonary disease (COPD) (HCC)     Difficulty in walking, not elsewhere classified     Essential (primary) hypertension     Gastro-esophageal reflux disease without esophagitis     Hypertension     Major depressive disorder, recurrent ([address]     Malignant neoplasm of bladder (HCC)     Muscle weakness (generalized)     Other myocardial infarction type ([address]     Other seizures ([address]     Personal history of peptic ulcer disease     Pneumonia, unspecified organism     Primary generalized (osteo)arthritis     Retention of urine, unspecified     Stomach ulcer     Urinary tract infection, site not specified       Past Surgical History:   Procedure Laterality Date    APPENDECTOMY      BLADDER TUMOR EXCISION      Bladder tumor resection    COLONOSCOPY      HYSTERECTOMY      MIDDLE EAR SURGERY Left     PAROTIDECTOMY  5/15/10    Left total    SALPINGO-OOPHORECTOMY      SMALL INTESTINE SURGERY         Family History   Problem Relation Age of Onset    Diabetes Other     Hypertension Other     Thyroid Disease Other     Cancer Mother     Diabetes type 2  Father        Social History     Tobacco Use    Smoking status: Former Smoker     Types: Cigarettes    Smokeless tobacco: Never Used   Substance Use Topics    Alcohol use: No      Current Outpatient Medications   Medication Sig Dispense Refill    guaiFENesin-dextromethorphan (ROBITUSSIN DM) 100-10 MG/5ML syrup Take 10 mLs by mouth every 4 hours as needed for Cough      metoprolol succinate (TOPROL XL) 25 MG extended release tablet Take 25 mg by mouth daily      levETIRAcetam (KEPPRA XR) 500 MG TB24 extended release tablet Take 1,000 mg by mouth daily      acetaminophen (TYLENOL) 325 MG tablet Take 650 mg by mouth daily And every 4 hours prn      hydrOXYzine (ATARAX) 10 MG tablet Take 1 tablet by mouth daily as needed for Anxiety (Give 1 tablet orally one time a day related to anxiety) (Patient taking differently: Take 10 mg by mouth daily ) 30 tablet 0    docusate sodium (COLACE) 100 MG capsule Take 2 capsules by mouth every 8 hours as needed for Constipation 60 capsule 0    potassium chloride (KLOR-CON M) 10 MEQ extended release tablet Take 1 tablet by mouth daily 180 tablet 0    ferrous sulfate 325 (65 Fe) MG tablet Take 1 tablet by mouth daily 180 tablet 0    sertraline (ZOLOFT) 25 MG tablet Take 1 tablet by mouth daily 30 tablet 0    omeprazole (PRILOSEC) 40 MG capsule Take 40 mg by mouth daily.  benazepril (LOTENSIN) 20 MG tablet Take 40 mg by mouth daily        No current facility-administered medications for this visit. Allergies   Allergen Reactions    Amoxicillin     Sulfa Antibiotics        Health Maintenance   Topic Date Due    DTaP/Tdap/Td vaccine (1 - Tdap) 1934    Shingles Vaccine (1 of 2) 10/10/1973    Pneumococcal 65+ years Vaccine (1 of 1 - PPSV23) 10/10/1988    Potassium monitoring  08/03/2015    Creatinine monitoring  08/03/2015    Flu vaccine (1) 09/01/2019       Subjective:      Review of Systems   Unable to perform ROS: Dementia (limited)   Constitutional: Negative for unexpected weight change. HENT: Negative for dental problem.     Eyes: Positive for visual disturbance. Cardiovascular: Negative for chest pain. Genitourinary: Negative for decreased urine volume. Musculoskeletal: Negative for back pain. Skin: Negative for pallor. Allergic/Immunologic: Negative for immunocompromised state. Neurological: Negative for weakness. Psychiatric/Behavioral: Positive for behavioral problems (at times) and confusion. Negative for sleep disturbance. Objective:     Physical Exam  Vitals signs and nursing note reviewed. Constitutional:       General: She is not in acute distress. Appearance: Normal appearance. She is well-developed. HENT:      Head: Normocephalic and atraumatic. Right Ear: External ear normal.      Left Ear: External ear normal.      Nose: Nose normal.   Eyes:      General: No scleral icterus. Right eye: No discharge. Left eye: No discharge. Conjunctiva/sclera: Conjunctivae normal.   Neck:      Musculoskeletal: Neck supple. Thyroid: No thyromegaly. Vascular: No JVD. Cardiovascular:      Rate and Rhythm: Normal rate and regular rhythm. Heart sounds: Normal heart sounds. Pulmonary:      Effort: Pulmonary effort is normal. No respiratory distress. Breath sounds: Normal breath sounds. No stridor. No wheezing or rales. Abdominal:      General: Bowel sounds are normal. There is no distension. Palpations: Abdomen is soft. Tenderness: There is no tenderness. Musculoskeletal: Normal range of motion. General: No deformity. Right shoulder: She exhibits no tenderness, no bony tenderness and no pain. Left shoulder: She exhibits no tenderness, no bony tenderness and no pain. Cervical back: She exhibits no tenderness, no bony tenderness and no pain. Thoracic back: She exhibits no tenderness, no bony tenderness, no pain and no spasm. Lumbar back: She exhibits no tenderness, no bony tenderness and no pain. Right lower leg: No edema. Angle this month. Will check level this month. 8. Osteoarthritis- stable, asymptomatic at this time. Monitor for pain. Continue tylenol. 9. Anemia- Will check B12 level this month. Patient seen and examined. History partially obtained by chart review and nursing notes. I have reviewed patient's past medical, surgical, social, and family history and have made updates where appropriate.   See facility EMR for updated medication list.       Electronically signed by Enrique Garcia MD on 1/3/2020 at 10:44 AM

## 2020-02-03 ENCOUNTER — OUTSIDE SERVICES (OUTPATIENT)
Dept: FAMILY MEDICINE CLINIC | Age: 85
End: 2020-02-03
Payer: MEDICARE

## 2020-02-03 VITALS
RESPIRATION RATE: 16 BRPM | HEART RATE: 71 BPM | BODY MASS INDEX: 18.47 KG/M2 | TEMPERATURE: 97.7 F | OXYGEN SATURATION: 96 % | SYSTOLIC BLOOD PRESSURE: 105 MMHG | WEIGHT: 111 LBS | DIASTOLIC BLOOD PRESSURE: 48 MMHG

## 2020-02-03 PROCEDURE — 99309 SBSQ NF CARE MODERATE MDM 30: CPT | Performed by: NURSE PRACTITIONER

## 2020-02-03 ASSESSMENT — ENCOUNTER SYMPTOMS: BACK PAIN: 0

## 2020-02-03 NOTE — PROGRESS NOTES
ago. Continue Keppra. Had F/U with Dr. Elyse Blackmon this month. Keppra level stable. 8. Osteoarthritis- stable, asymptomatic at this time. Monitor for pain. Continue tylenol. 9. Anemia- B12 level low. Will continue B12 and monitor. Patient seen and examined. History partially obtained by chart review and nursing notes. I have reviewed patient's past medical, surgical, social, and family history and have made updates where appropriate.   See facility EMR for updated medication list.       Electronically signed by BRENDEN Barajas CNP on 2/3/2020 at 4:16 PM

## 2020-03-10 ENCOUNTER — OUTSIDE SERVICES (OUTPATIENT)
Dept: FAMILY MEDICINE CLINIC | Age: 85
End: 2020-03-10
Payer: MEDICARE

## 2020-03-10 VITALS
DIASTOLIC BLOOD PRESSURE: 48 MMHG | TEMPERATURE: 97.6 F | BODY MASS INDEX: 18.67 KG/M2 | RESPIRATION RATE: 16 BRPM | WEIGHT: 112.2 LBS | SYSTOLIC BLOOD PRESSURE: 102 MMHG | HEART RATE: 58 BPM | OXYGEN SATURATION: 95 %

## 2020-03-10 PROCEDURE — 99309 SBSQ NF CARE MODERATE MDM 30: CPT | Performed by: NURSE PRACTITIONER

## 2020-03-10 NOTE — PROGRESS NOTES
Lara Matthews is a 80 y.o. female who presents today for her medical conditions/complaints as noted below. Chief Complaint   Patient presents with    1 Month Follow-Up     Follow up on chronic health conditions           HPI:     Lorri Bautista is seen today for follow up on her chronic health conditions. She has no complaints. She has had one fall in the past month. She is pleasant today, but does have issues with behaviors at times with staff. Weight is overall stable. No recent labs.        Past Medical History:   Diagnosis Date    Anemia, unspecified     Anxiety disorder, unspecified     Cardiac murmur, unspecified     Chronic obstructive pulmonary disease (COPD) (HCC)     Difficulty in walking, not elsewhere classified     Essential (primary) hypertension     Gastro-esophageal reflux disease without esophagitis     Hypertension     Major depressive disorder, recurrent ([address]     Malignant neoplasm of bladder (HCC)     Muscle weakness (generalized)     Other myocardial infarction type ([address]     Other seizures ([address]     Personal history of peptic ulcer disease     Pneumonia, unspecified organism     Primary generalized (osteo)arthritis     Retention of urine, unspecified     Stomach ulcer     Urinary tract infection, site not specified       Past Surgical History:   Procedure Laterality Date    APPENDECTOMY      BLADDER TUMOR EXCISION      Bladder tumor resection    COLONOSCOPY      HYSTERECTOMY      MIDDLE EAR SURGERY Left     PAROTIDECTOMY  5/15/10    Left total    SALPINGO-OOPHORECTOMY      SMALL INTESTINE SURGERY         Family History   Problem Relation Age of Onset    Diabetes Other     Hypertension Other     Thyroid Disease Other     Cancer Mother     Diabetes type 2  Father        Social History     Tobacco Use    Smoking status: Former Smoker     Types: Cigarettes    Smokeless tobacco: Never Used   Substance Use Topics    Alcohol use: No      Allergies   Allergen Reactions Follows with Dr. Hasmukh Canchola. No seizures. Continue Keppra. 8. Primary osteoarthritis involving multiple joints   Denies pain. Continue Tylenol as needed. 9. Anemia, unspecified type   Chronic and will continue lab monitoring and Ferrous sulfate. 10. Anxiety   Chronic and stable overall. Has agitation especially in the evening will continue to monitor. Patient seen and examined. History partially obtained by chart review and nursing notes. I have reviewed patient's past medical, surgical, social, and family history and have made updates where appropriate.   See facility EMR for updated medication list.       Electronically signed by BRENDEN Hilliard CNP on 3/10/2020 at 4:10 PM

## 2020-04-03 ENCOUNTER — OUTSIDE SERVICES (OUTPATIENT)
Dept: FAMILY MEDICINE CLINIC | Age: 85
End: 2020-04-03
Payer: MEDICARE

## 2020-04-03 VITALS
BODY MASS INDEX: 19.7 KG/M2 | HEART RATE: 66 BPM | DIASTOLIC BLOOD PRESSURE: 54 MMHG | RESPIRATION RATE: 16 BRPM | OXYGEN SATURATION: 93 % | WEIGHT: 118.4 LBS | TEMPERATURE: 97.3 F | SYSTOLIC BLOOD PRESSURE: 99 MMHG

## 2020-04-03 PROCEDURE — 99309 SBSQ NF CARE MODERATE MDM 30: CPT | Performed by: FAMILY MEDICINE

## 2020-04-03 PROCEDURE — 99490 CHRNC CARE MGMT STAFF 1ST 20: CPT | Performed by: FAMILY MEDICINE

## 2020-04-03 NOTE — PROGRESS NOTES
Omar Hinojosa is a 80 y.o. female who presents today for her medical conditions/complaints as noted below. Chief Complaint   Patient presents with    1 Month Follow-Up     Follow up on chronic health concerns           HPI:     Deisy Balderrama is seen today for follow up on her chronic health conditions. She has no complaints. She is pleasant and no new complaints. No recent falls or labs. Weight and appetite are stable.        Past Medical History:   Diagnosis Date    Anemia, unspecified     Anxiety disorder, unspecified     Cardiac murmur, unspecified     Chronic obstructive pulmonary disease (COPD) (HCC)     Difficulty in walking, not elsewhere classified     Essential (primary) hypertension     Gastro-esophageal reflux disease without esophagitis     Hypertension     Major depressive disorder, recurrent ([address]     Malignant neoplasm of bladder (HCC)     Muscle weakness (generalized)     Other myocardial infarction type ([address]     Other seizures ([address]     Personal history of peptic ulcer disease     Pneumonia, unspecified organism     Primary generalized (osteo)arthritis     Retention of urine, unspecified     Stomach ulcer     Urinary tract infection, site not specified       Past Surgical History:   Procedure Laterality Date    APPENDECTOMY      BLADDER TUMOR EXCISION      Bladder tumor resection    COLONOSCOPY      HYSTERECTOMY      MIDDLE EAR SURGERY Left     PAROTIDECTOMY  5/15/10    Left total    SALPINGO-OOPHORECTOMY      SMALL INTESTINE SURGERY         Family History   Problem Relation Age of Onset    Diabetes Other     Hypertension Other     Thyroid Disease Other     Cancer Mother     Diabetes type 2  Father        Social History     Tobacco Use    Smoking status: Former Smoker     Types: Cigarettes    Smokeless tobacco: Never Used   Substance Use Topics    Alcohol use: No      Allergies   Allergen Reactions    Amoxicillin     Sulfa Antibiotics        Health Maintenance

## 2020-05-01 ENCOUNTER — OUTSIDE SERVICES (OUTPATIENT)
Dept: FAMILY MEDICINE CLINIC | Age: 85
End: 2020-05-01
Payer: MEDICARE

## 2020-05-01 VITALS
RESPIRATION RATE: 18 BRPM | SYSTOLIC BLOOD PRESSURE: 118 MMHG | OXYGEN SATURATION: 93 % | DIASTOLIC BLOOD PRESSURE: 59 MMHG | HEART RATE: 78 BPM | WEIGHT: 121 LBS | TEMPERATURE: 97.3 F | BODY MASS INDEX: 20.14 KG/M2

## 2020-05-01 PROCEDURE — 99309 SBSQ NF CARE MODERATE MDM 30: CPT | Performed by: NURSE PRACTITIONER

## 2020-05-01 PROCEDURE — 99490 CHRNC CARE MGMT STAFF 1ST 20: CPT | Performed by: NURSE PRACTITIONER

## 2020-05-01 NOTE — PROGRESS NOTES
tenderness and no pain. Thoracic back: She exhibits no tenderness, no bony tenderness, no pain and no spasm. Lumbar back: She exhibits no tenderness, no bony tenderness and no pain. Lymphadenopathy:      Cervical: No cervical adenopathy. Upper Body:      Right upper body: No supraclavicular adenopathy. Left upper body: No supraclavicular adenopathy. Skin:     General: Skin is warm and dry. Findings: No erythema or rash. Neurological:      Mental Status: She is alert. Mental status is at baseline. She is disoriented and confused. Motor: No atrophy, abnormal muscle tone or seizure activity. Psychiatric:         Mood and Affect: Mood normal. Affect is labile and flat. Speech: Speech normal. Speech is not delayed. Behavior: Behavior is agitated (at times per staff report). Behavior is cooperative. Cognition and Memory: Memory is impaired. She exhibits impaired recent memory and impaired remote memory. Judgment: Judgment is impulsive (at times). BP (!) 118/59   Pulse 78   Temp 97.3 °F (36.3 °C)   Resp 18   Wt 121 lb (54.9 kg)   SpO2 93%   BMI 20.14 kg/m²     Assessment/Plan      1. Essential hypertension   Chronic and improved. Continue Benezapril. Continue to monitor. 2. Chronic gastric ulcer without hemorrhage and without perforation   Stable and denies s/s. Continue to monitor intakes. Omeprazole daily. 3. Malignant neoplasm of urinary bladder, unspecified site [facility])   Follows with urology. No s/s of infections. Denies hematuria. Monitor for decline. 4. Moderate episode of recurrent major depressive disorder (HCC)   Chronic. Will continue Zoloft with no reduction. 5. Alzheimer's dementia without behavioral disturbance, unspecified timing of dementia onset ([facility]   Chronic and one fall in the last month. Continue to support and monitor behaviors.     6. Chronic bronchitis, unspecified chronic bronchitis type ([address]   Denies

## 2020-05-18 ENCOUNTER — OUTSIDE SERVICES (OUTPATIENT)
Dept: FAMILY MEDICINE CLINIC | Age: 85
End: 2020-05-18
Payer: MEDICARE

## 2020-05-18 VITALS
HEART RATE: 68 BPM | TEMPERATURE: 97.5 F | BODY MASS INDEX: 19.24 KG/M2 | DIASTOLIC BLOOD PRESSURE: 64 MMHG | RESPIRATION RATE: 16 BRPM | SYSTOLIC BLOOD PRESSURE: 128 MMHG | WEIGHT: 115.6 LBS | OXYGEN SATURATION: 94 %

## 2020-05-18 PROCEDURE — 99308 SBSQ NF CARE LOW MDM 20: CPT | Performed by: NURSE PRACTITIONER

## 2020-05-18 ASSESSMENT — ENCOUNTER SYMPTOMS
SORE THROAT: 0
VOMITING: 0
SHORTNESS OF BREATH: 0
WHEEZING: 0
RHINORRHEA: 0
DIARRHEA: 0
NAUSEA: 0
CONSTIPATION: 0
COUGH: 0

## 2020-05-18 NOTE — PROGRESS NOTES
General: Skin is warm and dry. Findings: Bruising and erythema present. Neurological:      Mental Status: She is alert and oriented to person, place, and time. Psychiatric:         Mood and Affect: Affect is blunt and angry. Behavior: Behavior is agitated. Cognition and Memory: Memory is impaired. /64   Pulse 68   Temp 97.5 °F (36.4 °C)   Resp 16   Wt 115 lb 9.6 oz (52.4 kg)   SpO2 94%   BMI 19.24 kg/m²     Assessment/Plan      1. Skin tear of hand without complication, left, initial encounter    Will add Keflex twice daily x 10 days, Tdap, and steri strips place to the area. Monitor. Update if behaviors continue. Patient seen and examined. History partially obtained by chart review and nursing notes. I have reviewed patient's past medical, surgical, social, and family history and have made updates where appropriate.   See facility EMR for updated medication list.       Electronically signed by BRENDEN Marquez CNP on 5/18/2020 at 11:45 AM

## 2020-06-05 ENCOUNTER — OUTSIDE SERVICES (OUTPATIENT)
Dept: FAMILY MEDICINE CLINIC | Age: 85
End: 2020-06-05
Payer: MEDICARE

## 2020-06-05 VITALS
OXYGEN SATURATION: 96 % | HEART RATE: 69 BPM | TEMPERATURE: 97.8 F | RESPIRATION RATE: 16 BRPM | BODY MASS INDEX: 18.97 KG/M2 | DIASTOLIC BLOOD PRESSURE: 51 MMHG | SYSTOLIC BLOOD PRESSURE: 108 MMHG | WEIGHT: 114 LBS

## 2020-06-05 PROCEDURE — 99309 SBSQ NF CARE MODERATE MDM 30: CPT | Performed by: FAMILY MEDICINE

## 2020-06-05 PROCEDURE — 99490 CHRNC CARE MGMT STAFF 1ST 20: CPT | Performed by: FAMILY MEDICINE

## 2020-06-05 ASSESSMENT — ENCOUNTER SYMPTOMS
CONSTIPATION: 0
RHINORRHEA: 0
VOMITING: 0
NAUSEA: 0
WHEEZING: 0
SORE THROAT: 0
COUGH: 0
EYE REDNESS: 0
DIARRHEA: 0
SHORTNESS OF BREATH: 0

## 2020-07-01 ENCOUNTER — OUTSIDE SERVICES (OUTPATIENT)
Dept: FAMILY MEDICINE CLINIC | Age: 85
End: 2020-07-01
Payer: MEDICARE

## 2020-07-01 VITALS
TEMPERATURE: 97.7 F | RESPIRATION RATE: 16 BRPM | WEIGHT: 113.6 LBS | OXYGEN SATURATION: 95 % | HEART RATE: 68 BPM | DIASTOLIC BLOOD PRESSURE: 64 MMHG | BODY MASS INDEX: 18.9 KG/M2 | SYSTOLIC BLOOD PRESSURE: 118 MMHG

## 2020-07-01 PROBLEM — G45.9 TIA (TRANSIENT ISCHEMIC ATTACK): Status: ACTIVE | Noted: 2020-07-01

## 2020-07-01 PROCEDURE — 99490 CHRNC CARE MGMT STAFF 1ST 20: CPT | Performed by: NURSE PRACTITIONER

## 2020-07-01 PROCEDURE — 99309 SBSQ NF CARE MODERATE MDM 30: CPT | Performed by: NURSE PRACTITIONER

## 2020-07-01 PROCEDURE — 99356 PR PROLONGED SVC I/P OR OBS SETTING 1ST HOUR: CPT | Performed by: NURSE PRACTITIONER

## 2020-07-01 ASSESSMENT — ENCOUNTER SYMPTOMS: SHORTNESS OF BREATH: 0

## 2020-07-01 NOTE — PROGRESS NOTES
Carey Diaz is a 80 y.o. female who presents today for her medical conditions/complaints as noted below. Chief Complaint   Patient presents with    1 Month Follow-Up     Follow up on chronic health conditions    Other     Follow up on hospitalization           HPI:     Pat Carney is seen today for a monthly visit and follow up for chronic health conditions. She was sent to ER on 6/23 with stroke like s/s. She has a PMH of HTN, Dementia, Seizure disorder, COPD, GERD, Bladder cancer, OA. She developed generalized weakness, ataxia, near fall on the day of 6/23. She developed decreased responsiveness, drooling ad left sided weakness. While in ER she had a CT of the head and and showed no acute hemorrhage. Her s/s did subside. She was admitted for further management. She returned on 6/25 with a diagnosis of TIA. She had no changes to her medical regimen. On 6/25 she had a subsequent episode and family did not want to send her out again. Her code status has now been updated to [facility]. She has been refusing her medications the last couple of days and we will be monitoring this. Labs reviewed from hospital and overall stable.        Past Medical History:   Diagnosis Date    Anemia, unspecified     Anxiety disorder, unspecified     Cardiac murmur, unspecified     Chronic obstructive pulmonary disease (COPD) (HCC)     Difficulty in walking, not elsewhere classified     Essential (primary) hypertension     Gastro-esophageal reflux disease without esophagitis     Hypertension     Major depressive disorder, recurrent ([address]     Malignant neoplasm of bladder (HCC)     Muscle weakness (generalized)     Other myocardial infarction type ([address]     Other seizures ([address]     Personal history of peptic ulcer disease     Pneumonia, unspecified organism     Primary generalized (osteo)arthritis     Retention of urine, unspecified     Stomach ulcer     Urinary tract infection, site not specified       Past Surgical History:   Procedure Laterality Date    APPENDECTOMY      BLADDER TUMOR EXCISION      Bladder tumor resection    COLONOSCOPY      HYSTERECTOMY      MIDDLE EAR SURGERY Left     PAROTIDECTOMY  5/15/10    Left total    SALPINGO-OOPHORECTOMY      SMALL INTESTINE SURGERY         Family History   Problem Relation Age of Onset    Diabetes Other     Hypertension Other     Thyroid Disease Other     Cancer Mother     Diabetes type 2  Father        Social History     Tobacco Use    Smoking status: Former Smoker     Types: Cigarettes    Smokeless tobacco: Never Used   Substance Use Topics    Alcohol use: No      Allergies   Allergen Reactions    Amoxicillin     Sulfa Antibiotics        Health Maintenance   Topic Date Due    DTaP/Tdap/Td vaccine (1 - Tdap) 10/10/1942    Shingles Vaccine (1 of 2) 10/10/1973    Pneumococcal 65+ years Vaccine (1 of 1 - PPSV23) 10/10/1988    Flu vaccine (1) 09/01/2020    Potassium monitoring  06/23/2021    Creatinine monitoring  06/23/2021    Hepatitis A vaccine  Aged Out    Hepatitis B vaccine  Aged Out    Hib vaccine  Aged Out    Meningococcal (ACWY) vaccine  Aged Out       Subjective:      Review of Systems   Unable to perform ROS: Dementia (limited)   Constitutional: Negative for unexpected weight change (down 2 pounds in the last month). HENT: Positive for hearing loss. Eyes: Positive for visual disturbance (wears glasses). Respiratory: Negative for shortness of breath. Cardiovascular: Negative for chest pain and leg swelling. Musculoskeletal: Positive for gait problem (history of falls; one fall in the last month). Skin: Negative for pallor. Allergic/Immunologic: Positive for immunocompromised state. Neurological: Negative for weakness. Hematological: Bruises/bleeds easily. Psychiatric/Behavioral: Positive for agitation (at times per staff), behavioral problems (refusal of medication per staff) and confusion.        Objective:     Physical rash.   Neurological:      Mental Status: She is alert. Mental status is at baseline. She is disoriented. Motor: No atrophy, abnormal muscle tone or seizure activity. Psychiatric:         Attention and Perception: Attention normal.         Mood and Affect: Mood normal.         Speech: Speech is delayed. Behavior: Behavior normal. Behavior is cooperative. Cognition and Memory: Memory is impaired. She exhibits impaired recent memory and impaired remote memory. /64   Pulse 68   Temp 97.7 °F (36.5 °C)   Resp 16   Wt 113 lb 9.6 oz (51.5 kg)   SpO2 95%   BMI 18.90 kg/m²     Assessment/Plan      1. TIA (transient ischemic attack) - Recent hospitalization. No acute changes on CT. Continue to monitor blood pressures. Monitor for changes. Continue blood pressure medications. 2. Alzheimer's dementia without behavioral disturbance, unspecified timing of dementia onset ([facility]  Chronic and increased behaviors. Has been refusing medications. Will monitor and continue Aricept. 3. Essential hypertension - Recent episodes of TIA's with no changes on CT. Will continue Benazepril and monitor. 4. Moderate episode of recurrent major depressive disorder (HCC)   Mood stable today, but increased mood indicators. Will continue Zoloft and monitor. 5. Seizure disorder ([facility]   Follows with Dr. Vicki Best. Continue Keppra and lab monitoring. 6. Primary osteoarthritis involving multiple joints   Denies pain. Continue Tylenol and assist with ambulation. 7. Anemia, unspecified type   Chronic and continue Ferrous sulfate and lab monitoring. 8. Chronic bronchitis, unspecified chronic bronchitis type ([facility]   Lungs clear, continue to monitor. PRN meds in place. 9. Malignant neoplasm of urinary bladder, unspecified site ([facility]   Chronic and continue to monitor for increased s/s. No blood noted recently.         Resident has Alzheimer's dementia, Bladder cancer, TIA, HTN and it is expected to last 12 or more months. These chronic conditions place resident at a significant risk of death, acute exacerbation, decline in function or functional decline. Her comprehensive plan was monitored today. I spent 20 minutes reviewing plan. I certify that I spent 35 reviewing chart outside of visit from recent hospitalization. Patient seen and examined. History partially obtained by chart review and nursing notes. I have reviewed patient's past medical, surgical, social, and family history and have made updates where appropriate.   See facility EMR for updated medication list.       Electronically signed by BRENDEN Graham CNP on 7/1/2020 at 8:57 AM

## 2020-08-06 NOTE — PROGRESS NOTES
Trey Alegre is a 80 y.o. female who presents today for her medical conditions/complaints as noted below. Chief Complaint   Patient presents with    1 Month Follow-Up           HPI:     Maria Luz Yoder is seen today for follow up on chronic health conditions. She is up sitting in her room today and denies pain but does not answer any other questions. She is declining overall including her weight. Her weight is down to 111 which is down 6 pounds since last month. She does not eat well. She is not taking her medications well either. No recent falls. She has chronic health conditions of Alzheimer's disease, HTN with recent TIA, MDD, OA, seizure disorder, and hx of bladder cancer. Blood pressures have been well controlled on current regimen of low dose of Benazepril. Mood is variable and at times does not take her medications. No recent seizures and remains on Keppra. Keppra level remains stable.        Past Medical History:   Diagnosis Date    Anemia, unspecified     Anxiety disorder, unspecified     Cardiac murmur, unspecified     Chronic obstructive pulmonary disease (COPD) (HCC)     Difficulty in walking, not elsewhere classified     Essential (primary) hypertension     Gastro-esophageal reflux disease without esophagitis     Hypertension     Major depressive disorder, recurrent ([address]     Malignant neoplasm of bladder (HCC)     Muscle weakness (generalized)     Other myocardial infarction type ([address]     Other seizures ([address]     Personal history of peptic ulcer disease     Pneumonia, unspecified organism     Primary generalized (osteo)arthritis     Retention of urine, unspecified     Stomach ulcer     Urinary tract infection, site not specified       Past Surgical History:   Procedure Laterality Date    APPENDECTOMY      BLADDER TUMOR EXCISION      Bladder tumor resection    COLONOSCOPY      HYSTERECTOMY      MIDDLE EAR SURGERY Left     PAROTIDECTOMY  5/15/10    Left total    SALPINGO-OOPHORECTOMY      SMALL INTESTINE SURGERY         Family History   Problem Relation Age of Onset    Diabetes Other     Hypertension Other     Thyroid Disease Other     Cancer Mother     Diabetes type 2  Father        Social History     Tobacco Use    Smoking status: Former Smoker     Types: Cigarettes    Smokeless tobacco: Never Used   Substance Use Topics    Alcohol use: No      Allergies   Allergen Reactions    Amoxicillin     Sulfa Antibiotics        Health Maintenance   Topic Date Due    DTaP/Tdap/Td vaccine (1 - Tdap) 10/10/1942    Shingles Vaccine (1 of 2) 10/10/1973    Pneumococcal 65+ years Vaccine (1 of 1 - PPSV23) 10/10/1988    Flu vaccine (1) 09/01/2020    Potassium monitoring  06/23/2021    Creatinine monitoring  06/23/2021    Hepatitis A vaccine  Aged Out    Hepatitis B vaccine  Aged Out    Hib vaccine  Aged Out    Meningococcal (ACWY) vaccine  Aged Out       Subjective:      Review of Systems   Unable to perform ROS: Dementia       Objective:     Physical Exam  Constitutional:       General: She is not in acute distress. Appearance: Normal appearance. She is not ill-appearing. HENT:      Head: Normocephalic and atraumatic. Right Ear: Hearing normal.      Left Ear: Hearing normal.      Nose: Nose normal.   Eyes:      General: Lids are normal.   Neck:      Musculoskeletal: Normal range of motion and neck supple. Cardiovascular:      Rate and Rhythm: Normal rate and regular rhythm. Heart sounds: Normal heart sounds, S1 normal and S2 normal.   Pulmonary:      Effort: Pulmonary effort is normal. No tachypnea or bradypnea. Breath sounds: Normal breath sounds. Abdominal:      General: Abdomen is flat. Bowel sounds are normal.      Palpations: Abdomen is soft. Tenderness: There is no abdominal tenderness. Musculoskeletal:      Right lower leg: No edema. Left lower leg: No edema. Skin:     General: Skin is warm and dry.    Neurological: partially obtained by chart review and nursing notes. I have reviewed patient's past medical, surgical, social, and family history and have made updates where appropriate.   See facility EMR for updated medication list.       Electronically signed by BRENDEN Paris CNP on 8/6/2020 at 3:39 PM

## 2020-09-11 NOTE — PROGRESS NOTES
Stevie Hancock is a 80 y.o. female who presents today for her medical conditions/complaints as noted below. Chief Complaint   Patient presents with    Other     monthly follow up of chronic conditions           HPI:     Patient is seen and examined in her room today for monthly follow-up of chronic medical conditions. She has a history of dementia as well as hypertension, seizure disorder, depressionShe is resting comfortably in bed but does awake when approached. She denies any complaints of chest pain or shortness of breath. She states she has been sleeping well. She is not very conversational but is cooperative. Her dementia has been slowly worsening although her weight has improved with a weight gain of 3 pounds since previous month. Her mood is still labile but seems overall controlled with her Zoloft and Remeron. Her blood pressure is also overall controlled and she denies headache or chest pain. She does take benazepril daily for this. Has a history of seizure disorders but is on Keppra and has had no recent seizures. No new concerns per staff and has not had any falls recently.       Past Medical History:   Diagnosis Date    Anemia, unspecified     Anxiety disorder, unspecified     Cardiac murmur, unspecified     Chronic obstructive pulmonary disease (COPD) (HCC)     Difficulty in walking, not elsewhere classified     Essential (primary) hypertension     Gastro-esophageal reflux disease without esophagitis     Hypertension     Major depressive disorder, recurrent ([address]     Malignant neoplasm of bladder (HCC)     Muscle weakness (generalized)     Other myocardial infarction type ([address]     Other seizures ([address]     Personal history of peptic ulcer disease     Pneumonia, unspecified organism     Primary generalized (osteo)arthritis     Retention of urine, unspecified     Stomach ulcer     Urinary tract infection, site not specified       Past Surgical History:   Procedure Laterality Date    APPENDECTOMY      BLADDER TUMOR EXCISION      Bladder tumor resection    COLONOSCOPY      HYSTERECTOMY      MIDDLE EAR SURGERY Left     PAROTIDECTOMY  5/15/10    Left total    SALPINGO-OOPHORECTOMY      SMALL INTESTINE SURGERY         Family History   Problem Relation Age of Onset    Diabetes Other     Hypertension Other     Thyroid Disease Other     Cancer Mother     Diabetes type 2  Father        Social History     Tobacco Use    Smoking status: Former Smoker     Types: Cigarettes    Smokeless tobacco: Never Used   Substance Use Topics    Alcohol use: No      Allergies   Allergen Reactions    Amoxicillin     Sulfa Antibiotics        Health Maintenance   Topic Date Due    DTaP/Tdap/Td vaccine (1 - Tdap) 10/10/1942    Shingles Vaccine (1 of 2) 10/10/1973    Pneumococcal 65+ years Vaccine (1 of 1 - PPSV23) 10/10/1988    Flu vaccine (1) 09/01/2020    Potassium monitoring  06/23/2021    Creatinine monitoring  06/23/2021    Hepatitis A vaccine  Aged Out    Hepatitis B vaccine  Aged Out    Hib vaccine  Aged Out    Meningococcal (ACWY) vaccine  Aged Out       Subjective:      Review of Systems   Unable to perform ROS: Dementia       Objective:     Physical Exam  Vitals signs and nursing note reviewed. Constitutional:       General: She is not in acute distress. Appearance: Normal appearance. She is well-developed. HENT:      Head: Normocephalic and atraumatic. Right Ear: External ear normal.      Left Ear: External ear normal.      Nose: Nose normal.   Eyes:      General: No scleral icterus. Right eye: No discharge. Left eye: No discharge. Conjunctiva/sclera: Conjunctivae normal.   Neck:      Musculoskeletal: Neck supple. Thyroid: No thyromegaly. Vascular: No JVD. Cardiovascular:      Rate and Rhythm: Normal rate and regular rhythm. Heart sounds: Normal heart sounds.    Pulmonary:      Effort: Pulmonary effort is normal. No 6. Primary osteoarthritis involving multiple joints      1. Slowly progressing, cont aricept. Weight improved from last month  2. Controlled on remeron and zoloft. Monitor for s/s  3. Overall controlled with benazpril. Rare highs, will monitor. 4. Seizure disorder- chronic and controlled with keprra, cont dose  5. Hx of cancer- coservative management  6. OA- monitor for pain symptoms, tylenol prn    Resident has Alzheimer's , MDD, HTN, seizure disorder and it is expected to last 12 or more months. These chronic conditions place resident at a significant risk of death, acute exacerbation, or functional decline. The patient's comprehensive plan was monitored today. I spent 20 minutes reviewing plan. Patient seen and examined. History partially obtained by chart review and nursing notes. I have reviewed patient's past medical, surgical, social, and family history and have made updates where appropriate.   See facility EMR for updated medication list.       Electronically signed by Cheo Pugh MD on 9/11/2020 at 12:54 PM

## 2020-10-05 NOTE — PROGRESS NOTES
Jaswinder Blake is a 80 y.o. female who presents today for her medical conditions/complaints as noted below. Chief Complaint   Patient presents with    1 Month Follow-Up     Follow up on chronic health conditions           HPI:     Karen in today in her room for follow-up on her chronic health conditions. She has a primary diagnosis of Alzheimer's dementia, hypertension, seizure disorder, and depression. We have adjusted her medications as she has been refusing her pills for the last several months. We have changed it to only the medications that she will take and adjusted them to the times that she will take them. This may very on the time of day and whether or not she will take them. Currently she is on Remeron, hydroxyzine, and Keppra XR. She does have Tylenol as needed for pain. She is hard of hearing and does not answer questions appropriately today. She has not had any falls. Weight is overall stable. Blood pressure is stable without medication today. Review of vital signs reveals blood pressure has been stable in the last month. Weight is up 3 pounds in the last month.       Past Medical History:   Diagnosis Date    Anemia, unspecified     Anxiety disorder, unspecified     Cardiac murmur, unspecified     Chronic obstructive pulmonary disease (COPD) (HCC)     Difficulty in walking, not elsewhere classified     Essential (primary) hypertension     Gastro-esophageal reflux disease without esophagitis     Hypertension     Major depressive disorder, recurrent ([address]     Malignant neoplasm of bladder (HCC)     Muscle weakness (generalized)     Other myocardial infarction type ([address]     Other seizures ([address]     Personal history of peptic ulcer disease     Pneumonia, unspecified organism     Primary generalized (osteo)arthritis     Retention of urine, unspecified     Stomach ulcer     Urinary tract infection, site not specified       Past Surgical History:   Procedure Laterality Date sounds. Abdominal:      General: Abdomen is flat. Bowel sounds are normal.      Palpations: Abdomen is soft. Tenderness: There is no abdominal tenderness. Musculoskeletal:      Right lower leg: No edema. Left lower leg: No edema. Skin:     General: Skin is warm and dry. Neurological:      Mental Status: She is alert. Mental status is at baseline. She is disoriented. Psychiatric:         Speech: Speech is delayed. Cognition and Memory: Memory is impaired. She exhibits impaired recent memory and impaired remote memory. /83   Pulse 91   Temp 97.6 °F (36.4 °C)   Resp 16   Wt 116 lb 12.8 oz (53 kg)   SpO2 95%   BMI 19.44 kg/m²     Assessment/Plan      1. Late onset Alzheimer's disease with behavioral disturbance ([facility] - continue to monitor for falls. Monitor weights. Continue Hydroxyzine and Remeron. 2. Moderate episode of recurrent major depressive disorder (HCC) - continue Remeron and monitor. 3. Essential hypertension - Continue to monitor blood pressures and no current meds. 4. Seizure disorder ([facility] - continue Keppra XR and monitor. 5. Malignant neoplasm of urinary bladder, unspecified site (HCC) - no current s/s of UTI. Hx of microscopic blood. 6. Primary osteoarthritis involving multiple joints - Tylenol in place as needed. Continue to monitor for pain. Resident has AD, HTN, Bladder cancer and it is expected to last 12 or more months. These chronic conditions place resident at a significant risk of death, acute exacerbation, or functional decline. The patient's comprehensive plan was monitored today. I spent 20 minutes reviewing plan. Patient seen and examined. History partially obtained by chart review and nursing notes. I have reviewed patient's past medical, surgical, social, and family history and have made updates where appropriate.   See facility EMR for updated medication list.       Electronically signed by BRENDEN Mathews - EUGENE on 10/5/2020 at 10:29 AM

## 2020-11-06 NOTE — PROGRESS NOTES
Daralyn Boxer is a 80 y.o. female who presents today for her medical conditions/complaints as noted below. Chief Complaint   Patient presents with    Other     monthly follow up of chornic medical conditions           HPI:     Pt seen and examined in her room for follow up of chronic medical conditions. She has hx of dementia, MDD, HTN, and seizure disorder. Her dementia has been slowly progressing, and she has occasional behaviors. She does frequently refuse meds so many meds were recently discontinued. Since then, her bp has increased slightly and is running 287-587 systolic. She seems to do better in the evening. She has had no recent falls and weight has been overall stable, although she is down 2 # from last month. Today she is resting comfortably in her room, but does awaken to my voice. She denies complaints and is pleasantly confused.        Past Medical History:   Diagnosis Date    Anemia, unspecified     Anxiety disorder, unspecified     Cardiac murmur, unspecified     Chronic obstructive pulmonary disease (COPD) (HCC)     Difficulty in walking, not elsewhere classified     Essential (primary) hypertension     Gastro-esophageal reflux disease without esophagitis     Hypertension     Major depressive disorder, recurrent ([address]     Malignant neoplasm of bladder (HCC)     Muscle weakness (generalized)     Other myocardial infarction type ([address]     Other seizures ([address]     Personal history of peptic ulcer disease     Pneumonia, unspecified organism     Primary generalized (osteo)arthritis     Retention of urine, unspecified     Stomach ulcer     Urinary tract infection, site not specified       Past Surgical History:   Procedure Laterality Date    APPENDECTOMY      BLADDER TUMOR EXCISION      Bladder tumor resection    COLONOSCOPY      HYSTERECTOMY      MIDDLE EAR SURGERY Left     PAROTIDECTOMY  5/15/10    Left total    SALPINGO-OOPHORECTOMY      SMALL INTESTINE SURGERY Family History   Problem Relation Age of Onset    Diabetes Other     Hypertension Other     Thyroid Disease Other     Cancer Mother     Diabetes type 2  Father        Social History     Tobacco Use    Smoking status: Former Smoker     Types: Cigarettes    Smokeless tobacco: Never Used   Substance Use Topics    Alcohol use: No      Allergies   Allergen Reactions    Amoxicillin     Sulfa Antibiotics        Health Maintenance   Topic Date Due    DTaP/Tdap/Td vaccine (1 - Tdap) 10/10/1942    Shingles Vaccine (1 of 2) 10/10/1973    Pneumococcal 65+ years Vaccine (1 of 1 - PPSV23) 10/10/1988    Flu vaccine (1) 09/01/2020    Potassium monitoring  06/23/2021    Creatinine monitoring  06/23/2021    Hepatitis A vaccine  Aged Out    Hepatitis B vaccine  Aged Out    Hib vaccine  Aged Out    Meningococcal (ACWY) vaccine  Aged Out       Subjective:      Review of Systems   Unable to perform ROS: Dementia       Objective:     Physical Exam  Vitals signs and nursing note reviewed. Constitutional:       General: She is not in acute distress. Appearance: She is well-developed and normal weight. She is not ill-appearing. HENT:      Head: Normocephalic and atraumatic. Right Ear: External ear normal. Decreased hearing noted. Left Ear: External ear normal. Decreased hearing noted. Nose: Nose normal.   Eyes:      General: Lids are normal. Gaze aligned appropriately. No scleral icterus. Right eye: No discharge. Left eye: No discharge. Extraocular Movements:      Right eye: Normal extraocular motion. Left eye: Normal extraocular motion. Cardiovascular:      Rate and Rhythm: Normal rate and regular rhythm. Pulses: Normal pulses. Heart sounds: Normal heart sounds. Pulmonary:      Effort: Pulmonary effort is normal. No respiratory distress. Breath sounds: Normal breath sounds. No wheezing.    Abdominal:      General: Bowel sounds are normal. There is no distension. Palpations: Abdomen is soft. Tenderness: There is no abdominal tenderness. Musculoskeletal:         General: No swelling, tenderness or deformity. Skin:     General: Skin is warm and dry. Coloration: Skin is not jaundiced or pale. Findings: No erythema. Neurological:      Mental Status: She is alert and oriented to person, place, and time. Psychiatric:         Attention and Perception: She is inattentive. Mood and Affect: Affect is labile. Speech: Speech is delayed. Behavior: Behavior is slowed. Cognition and Memory: Cognition is impaired. Memory is impaired. BP (!) 150/90   Pulse 98   Temp 98.3 °F (36.8 °C)   Resp 16   Wt 114 lb (51.7 kg)   SpO2 94%   BMI 18.97 kg/m²     Assessment/Plan      1. Late onset Alzheimer's disease with behavioral disturbance ([facility]    2. Moderate episode of recurrent major depressive disorder ([facility]    3. Essential hypertension    4. Seizure disorder ([facility]    5. Primary osteoarthritis involving multiple joints      1. Does have occasional behaviors, but stable overall. Monitor weight. Safety precautions  2. MDD- overall stable on remeron  3. Increased somewhat, does often refuse meds but will restart benazepril  4. Seizure disorder- no recent seizures. Cont keppra  5. Monitor for s/s of pain. Cont tylenol prn    Resident has Alzheimer's dementia, MDD, HTN, seizure disorder and it is expected to last 12 or more months. These chronic conditions place resident at a significant risk of death, acute exacerbation, or functional decline. The patient's comprehensive plan was monitored today. I spent 20 minutes reviewing plan. Patient seen and examined. History partially obtained by chart review and nursing notes. I have reviewed patient's past medical, surgical, social, and family history and have made updates where appropriate.   See facility EMR for updated medication list.       Electronically signed by Mina Carmona MD on 11/6/2020 at 12:46 PM

## 2020-12-07 NOTE — PROGRESS NOTES
Jw Beckwith is a 80 y.o. female who presents today for her medical conditions/complaints as noted below. Chief Complaint   Patient presents with    1 Month Follow-Up     Follow up on chronic health conditions           HPI:     Victor Manuel Menon is seen today for follow-up on her chronic health conditions including hypertension, seizure disorder, history of bladder cancer, dementia, depression,GERD, OA. We are having issues with her taking her medications as she does not believe that these are her medications or that she has taken them already. She will throw medications across her room in addition to throwing her food. Today she states that the food today is not very good and she has only eating her pie. She did allow assessment today but there are times that she does not allow staff to get close to her or becomes combative. We are going to try to move her medications again to a different time to see if this assists with her blood pressure as it is elevated. She is currently on benazepril. If this does not work we will try a Catapres patch. She is not taking her Keppra and does have a seizure disorder she has not had any breakthrough seizures. Her weight is down 3 pounds in the last month. She is slowly declining and family is aware of this decline in addition to refusal of medications.       Past Medical History:   Diagnosis Date    Anemia, unspecified     Anxiety disorder, unspecified     Cardiac murmur, unspecified     Chronic obstructive pulmonary disease (COPD) (HCC)     Difficulty in walking, not elsewhere classified     Essential (primary) hypertension     Gastro-esophageal reflux disease without esophagitis     Hypertension     Major depressive disorder, recurrent ([address]     Malignant neoplasm of bladder (HCC)     Muscle weakness (generalized)     Other myocardial infarction type ([address]     Other seizures ([address]     Personal history of peptic ulcer disease     Pneumonia, unspecified organism ill-appearing. HENT:      Head: Normocephalic and atraumatic. Right Ear: Hearing normal.      Left Ear: Hearing normal.      Nose: Nose normal.   Eyes:      General: Lids are normal.   Neck:      Musculoskeletal: Normal range of motion and neck supple. Cardiovascular:      Rate and Rhythm: Normal rate and regular rhythm. Heart sounds: Normal heart sounds, S1 normal and S2 normal.   Pulmonary:      Effort: Pulmonary effort is normal. No tachypnea or bradypnea. Breath sounds: Normal breath sounds. Abdominal:      General: Abdomen is flat. Bowel sounds are normal.      Palpations: Abdomen is soft. Tenderness: There is no abdominal tenderness. Musculoskeletal:      Right lower leg: No edema. Left lower leg: No edema. Skin:     General: Skin is warm and dry. Neurological:      Mental Status: She is alert. Mental status is at baseline. She is disoriented. Psychiatric:         Mood and Affect: Affect is blunt. Speech: Speech is delayed. Behavior: Behavior is withdrawn. Cognition and Memory: Memory is impaired. She exhibits impaired recent memory and impaired remote memory. BP (!) 176/93   Pulse 90   Temp 97.2 °F (36.2 °C)   Resp 16   Wt 111 lb 6.4 oz (50.5 kg)   SpO2 96%   BMI 18.54 kg/m²     Assessment/Plan      1. Late onset Alzheimer's disease with behavioral disturbance ([facility] - Chronic and continue continue Atarax. 2. Moderate episode of recurrent major depressive disorder (HCC) - chronic and continue Remeron. Is refusing this but will change time. 3. Essential hypertension - Chronic and continue Benazepril and will monitor. 4. Seizure disorder ([facility] - chronic and continue Keppra. Will monitor intake of meds. 5. Primary osteoarthritis involving multiple joints - Chronic and monitor pain meds. 6. Malignant neoplasm of urinary bladder, unspecified site (HCC) - no s/s of pain or UTI. No further follow up with urology.       Resident has AD, HTN, Seizure disorder, Urinary cancer and it is expected to last 12 or more months. These chronic conditions place resident at a significant risk of death, acute exacerbation, or functional decline. The patient's comprehensive plan was monitored today. I spent 20 minutes reviewing plan. Patient seen and examined. History partially obtained by chart review and nursing notes. I have reviewed patient's past medical, surgical, social, and family history and have made updates where appropriate.   See facility EMR for updated medication list.       Electronically signed by BRENDEN Coffman CNP on 12/7/2020 at 1:59 PM

## 2021-01-01 ENCOUNTER — OUTSIDE SERVICES (OUTPATIENT)
Dept: FAMILY MEDICINE CLINIC | Age: 86
End: 2021-01-01
Payer: MEDICARE

## 2021-01-01 VITALS
BODY MASS INDEX: 15.08 KG/M2 | OXYGEN SATURATION: 94 % | WEIGHT: 90.6 LBS | TEMPERATURE: 97.9 F | HEART RATE: 90 BPM | DIASTOLIC BLOOD PRESSURE: 50 MMHG | SYSTOLIC BLOOD PRESSURE: 112 MMHG | RESPIRATION RATE: 16 BRPM

## 2021-01-01 VITALS
BODY MASS INDEX: 18.5 KG/M2 | RESPIRATION RATE: 16 BRPM | WEIGHT: 111.2 LBS | TEMPERATURE: 98.2 F | DIASTOLIC BLOOD PRESSURE: 72 MMHG | HEART RATE: 87 BPM | OXYGEN SATURATION: 98 % | SYSTOLIC BLOOD PRESSURE: 140 MMHG

## 2021-01-01 VITALS
DIASTOLIC BLOOD PRESSURE: 70 MMHG | OXYGEN SATURATION: 95 % | RESPIRATION RATE: 16 BRPM | TEMPERATURE: 97.7 F | SYSTOLIC BLOOD PRESSURE: 133 MMHG | WEIGHT: 101.4 LBS | BODY MASS INDEX: 16.87 KG/M2 | HEART RATE: 85 BPM

## 2021-01-01 VITALS
WEIGHT: 105.4 LBS | TEMPERATURE: 97.5 F | BODY MASS INDEX: 17.54 KG/M2 | DIASTOLIC BLOOD PRESSURE: 67 MMHG | OXYGEN SATURATION: 92 % | SYSTOLIC BLOOD PRESSURE: 136 MMHG | RESPIRATION RATE: 16 BRPM | HEART RATE: 98 BPM

## 2021-01-01 VITALS
HEART RATE: 92 BPM | OXYGEN SATURATION: 93 % | TEMPERATURE: 97.8 F | WEIGHT: 103.2 LBS | RESPIRATION RATE: 16 BRPM | DIASTOLIC BLOOD PRESSURE: 59 MMHG | BODY MASS INDEX: 17.17 KG/M2 | SYSTOLIC BLOOD PRESSURE: 118 MMHG

## 2021-01-01 VITALS
HEART RATE: 87 BPM | SYSTOLIC BLOOD PRESSURE: 116 MMHG | OXYGEN SATURATION: 90 % | TEMPERATURE: 97.7 F | RESPIRATION RATE: 14 BRPM | DIASTOLIC BLOOD PRESSURE: 57 MMHG | WEIGHT: 106.4 LBS | BODY MASS INDEX: 17.71 KG/M2

## 2021-01-01 VITALS
DIASTOLIC BLOOD PRESSURE: 79 MMHG | HEART RATE: 90 BPM | OXYGEN SATURATION: 93 % | TEMPERATURE: 97.1 F | RESPIRATION RATE: 18 BRPM | WEIGHT: 105.4 LBS | BODY MASS INDEX: 17.54 KG/M2 | SYSTOLIC BLOOD PRESSURE: 147 MMHG

## 2021-01-01 VITALS
RESPIRATION RATE: 16 BRPM | OXYGEN SATURATION: 92 % | TEMPERATURE: 97.7 F | SYSTOLIC BLOOD PRESSURE: 129 MMHG | BODY MASS INDEX: 17.71 KG/M2 | DIASTOLIC BLOOD PRESSURE: 83 MMHG | HEART RATE: 85 BPM | WEIGHT: 106.4 LBS

## 2021-01-01 DIAGNOSIS — S72.002G CLOSED FRACTURE OF LEFT HIP WITH DELAYED HEALING, SUBSEQUENT ENCOUNTER: Primary | ICD-10-CM

## 2021-01-01 DIAGNOSIS — Z51.5 HOSPICE CARE: ICD-10-CM

## 2021-01-01 DIAGNOSIS — C67.9 MALIGNANT NEOPLASM OF URINARY BLADDER, UNSPECIFIED SITE (HCC): ICD-10-CM

## 2021-01-01 DIAGNOSIS — F02.818 LATE ONSET ALZHEIMER'S DISEASE WITH BEHAVIORAL DISTURBANCE (HCC): ICD-10-CM

## 2021-01-01 DIAGNOSIS — G40.909 SEIZURE DISORDER (HCC): ICD-10-CM

## 2021-01-01 DIAGNOSIS — G30.1 LATE ONSET ALZHEIMER'S DISEASE WITH BEHAVIORAL DISTURBANCE (HCC): ICD-10-CM

## 2021-01-01 DIAGNOSIS — R91.8 MASS OF LEFT LUNG: ICD-10-CM

## 2021-01-01 DIAGNOSIS — F02.818 LATE ONSET ALZHEIMER'S DISEASE WITH BEHAVIORAL DISTURBANCE (HCC): Primary | ICD-10-CM

## 2021-01-01 DIAGNOSIS — M15.9 PRIMARY OSTEOARTHRITIS INVOLVING MULTIPLE JOINTS: ICD-10-CM

## 2021-01-01 DIAGNOSIS — F33.1 MODERATE EPISODE OF RECURRENT MAJOR DEPRESSIVE DISORDER (HCC): ICD-10-CM

## 2021-01-01 DIAGNOSIS — G30.1 LATE ONSET ALZHEIMER'S DISEASE WITH BEHAVIORAL DISTURBANCE (HCC): Primary | ICD-10-CM

## 2021-01-01 DIAGNOSIS — I10 ESSENTIAL HYPERTENSION: ICD-10-CM

## 2021-01-01 DIAGNOSIS — Z51.5 HOSPICE CARE: Primary | ICD-10-CM

## 2021-01-01 PROCEDURE — 99309 SBSQ NF CARE MODERATE MDM 30: CPT | Performed by: FAMILY MEDICINE

## 2021-01-01 PROCEDURE — 99490 CHRNC CARE MGMT STAFF 1ST 20: CPT | Performed by: FAMILY MEDICINE

## 2021-01-01 PROCEDURE — 99490 CHRNC CARE MGMT STAFF 1ST 20: CPT | Performed by: NURSE PRACTITIONER

## 2021-01-01 PROCEDURE — 99305 1ST NF CARE MODERATE MDM 35: CPT | Performed by: NURSE PRACTITIONER

## 2021-01-01 PROCEDURE — 99309 SBSQ NF CARE MODERATE MDM 30: CPT | Performed by: NURSE PRACTITIONER

## 2021-01-01 RX ORDER — MORPHINE SULFATE 20 MG/ML
SOLUTION ORAL
Qty: 30 ML | Refills: 0 | Status: SHIPPED | OUTPATIENT
Start: 2021-01-01 | End: 2021-08-08

## 2021-01-01 RX ORDER — HYDROCODONE BITARTRATE AND ACETAMINOPHEN 5; 325 MG/1; MG/1
1 TABLET ORAL EVERY 4 HOURS PRN
Qty: 180 TABLET | Refills: 0 | Status: SHIPPED | OUTPATIENT
Start: 2021-01-01 | End: 2021-01-01

## 2021-01-08 NOTE — PROGRESS NOTES
Delta Gutierrez is a 80 y.o. female who presents today for her medical conditions/complaints as noted below. Chief Complaint   Patient presents with    1 Month Follow-Up     Follow up on chronic health conditions           HPI:     Lian Burns is seen today in her room. She will not get out of bed and does not wish to have physical assessment. She has a PMH of hypertension, seizure disorder, history of bladder cancer, dementia, depression,GERD, OA. She is refusing most of her meds most days. She has had no seizures even with not taking her Keppra and her levels being low.        Past Medical History:   Diagnosis Date    Anemia, unspecified     Anxiety disorder, unspecified     Cardiac murmur, unspecified     Chronic obstructive pulmonary disease (COPD) (HCC)     Difficulty in walking, not elsewhere classified     Essential (primary) hypertension     Gastro-esophageal reflux disease without esophagitis     Hypertension     Major depressive disorder, recurrent ([address]     Malignant neoplasm of bladder (HCC)     Muscle weakness (generalized)     Other myocardial infarction type ([address]     Other seizures ([address]     Personal history of peptic ulcer disease     Pneumonia, unspecified organism     Primary generalized (osteo)arthritis     Retention of urine, unspecified     Stomach ulcer     Urinary tract infection, site not specified       Past Surgical History:   Procedure Laterality Date    APPENDECTOMY      BLADDER TUMOR EXCISION      Bladder tumor resection    COLONOSCOPY      HYSTERECTOMY      MIDDLE EAR SURGERY Left     PAROTIDECTOMY  5/15/10    Left total    SALPINGO-OOPHORECTOMY      SMALL INTESTINE SURGERY         Family History   Problem Relation Age of Onset    Diabetes Other     Hypertension Other     Thyroid Disease Other     Cancer Mother     Diabetes type 2  Father        Social History     Tobacco Use    Smoking status: Former Smoker     Types: Cigarettes  Smokeless tobacco: Never Used   Substance Use Topics    Alcohol use: No      Allergies   Allergen Reactions    Amoxicillin     Sulfa Antibiotics        Health Maintenance   Topic Date Due    DTaP/Tdap/Td vaccine (1 - Tdap) 10/10/1942    Shingles Vaccine (1 of 2) 10/10/1973    Pneumococcal 65+ years Vaccine (1 of 1 - PPSV23) 10/10/1988    Flu vaccine (1) 09/01/2020    Potassium monitoring  06/23/2021    Creatinine monitoring  06/23/2021    Hepatitis A vaccine  Aged Out    Hepatitis B vaccine  Aged Out    Hib vaccine  Aged Out    Meningococcal (ACWY) vaccine  Aged Out       Subjective:      Review of Systems   Unable to perform ROS: Dementia       Objective:     Physical Exam  Vitals signs and nursing note reviewed. Exam conducted with a chaperone present. Constitutional:       General: She is not in acute distress. Appearance: Normal appearance. She is not ill-appearing. HENT:      Head: Normocephalic and atraumatic. Right Ear: Hearing normal.      Left Ear: Hearing normal.      Nose: Nose normal.   Eyes:      General: Lids are normal.   Neck:      Musculoskeletal: Normal range of motion and neck supple. Cardiovascular:      Heart sounds: Normal heart sounds, S1 normal and S2 normal.   Pulmonary:      Effort: Pulmonary effort is normal. No tachypnea or bradypnea. Abdominal:      General: Abdomen is flat. Palpations: Abdomen is soft. Tenderness: There is no abdominal tenderness. Musculoskeletal:      Right lower leg: No edema. Left lower leg: No edema. Skin:     General: Skin is warm and dry. Neurological:      Mental Status: She is alert. Mental status is at baseline. She is disoriented. Psychiatric:         Cognition and Memory: Memory is impaired. She exhibits impaired recent memory and impaired remote memory. Judgment: Judgment is impulsive.

## 2021-02-08 NOTE — PROGRESS NOTES
Yvonna Brittle is a 80 y.o. female who presents today for her medical conditions/complaints as noted below. Chief Complaint   Patient presents with    1 Month Follow-Up     Follow up on chronic health conditions           HPI:     Karen seen today for follow-up on chronic health conditions including dementia, hypertension, history of bladder cancer, depression, seizure disorder, osteoarthritis. She is seen today in her room and is resting in bed. She did not speak and just looks at this provider when asked if I can assess her. She does allow me to listen to her heart lungs but then pushes me away. Weight is down 5 pounds in the last month. She continues to refuse her medications and I am going to discontinue all medications except for her antiseizure medications although she is refusing this also. We will continue to try to administer this. Blood pressures have been stable even though she is refusing medication. We will for hospice services if family desires.       Past Medical History:   Diagnosis Date    Anemia, unspecified     Anxiety disorder, unspecified     Cardiac murmur, unspecified     Chronic obstructive pulmonary disease (COPD) (HCC)     Difficulty in walking, not elsewhere classified     Essential (primary) hypertension     Gastro-esophageal reflux disease without esophagitis     Hypertension     Major depressive disorder, recurrent ([address]     Malignant neoplasm of bladder (HCC)     Muscle weakness (generalized)     Other myocardial infarction type ([address]     Other seizures ([address]     Personal history of peptic ulcer disease     Pneumonia, unspecified organism     Primary generalized (osteo)arthritis     Retention of urine, unspecified     Stomach ulcer     Urinary tract infection, site not specified       Past Surgical History:   Procedure Laterality Date    APPENDECTOMY      BLADDER TUMOR EXCISION      Bladder tumor resection    COLONOSCOPY      HYSTERECTOMY  MIDDLE EAR SURGERY Left     PAROTIDECTOMY  5/15/10    Left total    SALPINGO-OOPHORECTOMY      SMALL INTESTINE SURGERY         Family History   Problem Relation Age of Onset    Diabetes Other     Hypertension Other     Thyroid Disease Other     Cancer Mother     Diabetes type 2  Father        Social History     Tobacco Use    Smoking status: Former Smoker     Types: Cigarettes    Smokeless tobacco: Never Used   Substance Use Topics    Alcohol use: No      Allergies   Allergen Reactions    Amoxicillin     Sulfa Antibiotics        Health Maintenance   Topic Date Due    COVID-19 Vaccine (1 of 2) 10/10/1939    DTaP/Tdap/Td vaccine (1 - Tdap) 10/10/1942    Shingles Vaccine (1 of 2) 10/10/1973    Pneumococcal 65+ years Vaccine (1 of 1 - PPSV23) 10/10/1988    Flu vaccine (1) 09/01/2020    Potassium monitoring  06/23/2021    Creatinine monitoring  06/23/2021    Hepatitis A vaccine  Aged Out    Hepatitis B vaccine  Aged Out    Hib vaccine  Aged Out    Meningococcal (ACWY) vaccine  Aged Out       Subjective:      Review of Systems   Unable to perform ROS: Dementia       Objective:     Physical Exam  Vitals signs and nursing note reviewed. Exam conducted with a chaperone present. Constitutional:       General: She is not in acute distress. Appearance: Normal appearance. She is cachectic. She is ill-appearing. HENT:      Head: Normocephalic and atraumatic. Right Ear: Hearing normal.      Left Ear: Hearing normal.      Nose: Nose normal.   Eyes:      General: Lids are normal.   Neck:      Musculoskeletal: Normal range of motion and neck supple. Cardiovascular:      Rate and Rhythm: Normal rate and regular rhythm. Heart sounds: S1 normal and S2 normal. Murmur present. Pulmonary:      Effort: Pulmonary effort is normal. No tachypnea or bradypnea. Breath sounds: Normal breath sounds. Abdominal:      General: Abdomen is flat.  Bowel sounds are normal. Patient seen and examined. History partially obtained by chart review and nursing notes. I have reviewed patient's past medical, surgical, social, and family history and have made updates where appropriate.   See facility EMR for updated medication list.       Electronically signed by BRENDEN Rodriguez CNP on 2/8/2021 at 1:30 PM

## 2021-02-25 NOTE — PROGRESS NOTES
Kait Sommer is a 80 y.o. female who presents today for her medical conditions/complaints as noted below. Chief Complaint   Patient presents with    Other     Readmission to the facility           HPI:     Karen is seen today for readmission visit. She was sent to the hospital on 2/21/2021 after a fall in her room. She had immediate pain and was x-rayed here at the facility and was noted to have a left femoral neck fracture. She was sent to the emergency department and x-ray showed suspected left femoral neck fracture. She also had a chest x-ray which showed a left lung mass. Also incidentally she was noted to have hyperglycemia upon admission to the hospital.  She has a past medical history of Alzheimer's dementia, hypertension, anemia, seizure disorder, anxiety COPD, history of bladder cancer. Family discussed with surgeon and they all have agreed that the fracture was to be treated nonoperatively. She was seen by pulmonology and palliative care. She returned to the facility on 2/22/2021 with a palliative care referral and hospice is going to evaluate her. She remains a DNR CC. She is seen in her room today and has been assisted up to a recliner. She is restless but alert and confused per baseline. She does state that she wants to go back to bed but will stay up for lunch today. She denies pain while sitting up in the chair. She is taken Norco x3 since return to from the hospital.  She is difficult to give medications to as she does not like to take medications. She has been declining in health over the last 6 to 8 months. With a gradual weight loss.       Past Medical History:   Diagnosis Date    Anemia, unspecified     Anxiety disorder, unspecified     Cardiac murmur, unspecified     Chronic obstructive pulmonary disease (COPD) (HCC)     Difficulty in walking, not elsewhere classified     Essential (primary) hypertension     Gastro-esophageal reflux disease without esophagitis  Hypertension     Major depressive disorder, recurrent ([address]     Malignant neoplasm of bladder (HCC)     Muscle weakness (generalized)     Other myocardial infarction type ([address]     Other seizures ([address]     Personal history of peptic ulcer disease     Pneumonia, unspecified organism     Primary generalized (osteo)arthritis     Retention of urine, unspecified     Stomach ulcer     Urinary tract infection, site not specified       Past Surgical History:   Procedure Laterality Date    APPENDECTOMY      BLADDER TUMOR EXCISION      Bladder tumor resection    COLONOSCOPY      HYSTERECTOMY      MIDDLE EAR SURGERY Left     PAROTIDECTOMY  5/15/10    Left total    SALPINGO-OOPHORECTOMY      SMALL INTESTINE SURGERY         Family History   Problem Relation Age of Onset    Diabetes Other     Hypertension Other     Thyroid Disease Other     Cancer Mother     Diabetes type 2  Father        Social History     Tobacco Use    Smoking status: Former Smoker     Types: Cigarettes    Smokeless tobacco: Never Used   Substance Use Topics    Alcohol use: No      Allergies   Allergen Reactions    Amoxicillin     Sulfa Antibiotics        Health Maintenance   Topic Date Due    COVID-19 Vaccine (1 of 2) 10/10/1939    DTaP/Tdap/Td vaccine (1 - Tdap) 10/10/1942    Shingles Vaccine (1 of 2) 10/10/1973    Pneumococcal 65+ years Vaccine (1 of 1 - PPSV23) 10/10/1988    Flu vaccine (1) 09/01/2020    Potassium monitoring  02/21/2022    Creatinine monitoring  02/21/2022    Hepatitis A vaccine  Aged Out    Hepatitis B vaccine  Aged Out    Hib vaccine  Aged Out    Meningococcal (ACWY) vaccine  Aged Out       Subjective:      Review of Systems   Unable to perform ROS: Dementia       Objective:     Physical Exam  Vitals signs and nursing note reviewed. Exam conducted with a chaperone present. Constitutional:       General: She is not in acute distress. Appearance: She is ill-appearing (chronically).    HENT: Head: Normocephalic and atraumatic. Right Ear: Decreased hearing noted. Left Ear: Decreased hearing noted. Nose: Nose normal.   Eyes:      General: Lids are normal.   Neck:      Musculoskeletal: Normal range of motion and neck supple. Cardiovascular:      Rate and Rhythm: Normal rate and regular rhythm. Heart sounds: S1 normal and S2 normal. Murmur present. Pulmonary:      Effort: Pulmonary effort is normal. No tachypnea or bradypnea. Breath sounds: Decreased breath sounds present. Abdominal:      General: Abdomen is flat. Bowel sounds are normal.      Palpations: Abdomen is soft. Tenderness: There is no abdominal tenderness. Musculoskeletal:      Left hip: She exhibits decreased range of motion, decreased strength, tenderness and swelling. Right lower leg: No edema. Left lower leg: No edema. Skin:     General: Skin is warm and dry. Neurological:      Mental Status: She is alert. Mental status is at baseline. She is disoriented. Psychiatric:         Speech: Speech is delayed. Cognition and Memory: Memory is impaired. She exhibits impaired recent memory and impaired remote memory. BP (!) 116/57   Pulse 87   Temp 97.7 °F (36.5 °C)   Resp 14   Wt 106 lb 6.4 oz (48.3 kg)   SpO2 90%   BMI 17.71 kg/m²     Assessment/Plan      1. Closed fracture of left hip with delayed healing, subsequent encounter -recent fall and fracture. Continue [place].  Hospice to evaluate tomorrow. 2. Mass of left lung -continue to support. Hospice to eval.   3. Late onset Alzheimer's disease with behavioral disturbance ([facility] -weight with no change since beginning of the month. Continue Remeron and hydroxyzine. Has been down 5 pounds since last month. 4. Moderate episode of recurrent major depressive disorder (HCC) -chronic and continue Remeron and hydroxyzine. 5. Essential hypertension --Blood pressure stable. Continue to monitor blood pressures. 6. Seizure disorder ([facility] -continue Keppra although patient sporadically takes. We will continue to monitor for seizures. 7. Primary osteoarthritis involving multiple joints -sent fracture. Continue Tylenol and Norco.   8. Malignant neoplasm of urinary bladder, unspecified site [facility]) -continue to monitor for bleeding. Follow clinically. Hospice eval tomorrow. Patient seen and examined. History partially obtained by chart review and nursing notes. I have reviewed patient's past medical, surgical, social, and family history and have made updates where appropriate.   See facility EMR for updated medication list.       Electronically signed by BRENDEN Perkins CNP on 2/25/2021 at 11:35 AM

## 2021-03-19 NOTE — PROGRESS NOTES
0    sertraline (ZOLOFT) 25 MG tablet Take 1 tablet by mouth daily 30 tablet 0    omeprazole (PRILOSEC) 40 MG capsule Take 40 mg by mouth daily.  benazepril (LOTENSIN) 20 MG tablet Take 40 mg by mouth daily        No current facility-administered medications for this visit. Allergies   Allergen Reactions    Amoxicillin     Sulfa Antibiotics        Subjective:      Review of Systems   Unable to perform ROS: Dementia       Objective:     /70   Pulse 85   Temp 97.7 °F (36.5 °C)   Resp 16   Wt 101 lb 6.4 oz (46 kg)   SpO2 95%   BMI 16.87 kg/m²     Physical Exam  Vitals signs and nursing note reviewed. Exam conducted with a chaperone present. Constitutional:       General: She is not in acute distress. Appearance: Normal appearance. She is cachectic. She is ill-appearing (chronically). HENT:      Head: Normocephalic and atraumatic. Right Ear: Hearing normal.      Left Ear: Hearing normal.      Nose: Nose normal.   Eyes:      General: Lids are normal.   Neck:      Musculoskeletal: Normal range of motion and neck supple. Cardiovascular:      Rate and Rhythm: Normal rate and regular rhythm. Heart sounds: S1 normal and S2 normal. Murmur present. Pulmonary:      Effort: Pulmonary effort is normal. No tachypnea or bradypnea. Breath sounds: Decreased breath sounds present. Abdominal:      General: Abdomen is flat. Bowel sounds are normal.      Palpations: Abdomen is soft. Tenderness: There is no abdominal tenderness. Musculoskeletal:      Right lower leg: No edema. Left lower leg: No edema. Skin:     General: Skin is warm and dry. Neurological:      Mental Status: She is alert. Mental status is at baseline. She is disoriented. Psychiatric:         Mood and Affect: Affect is labile. Speech: Speech is delayed. Behavior: Behavior is cooperative. Cognition and Memory: Memory is impaired.  She exhibits impaired recent memory and impaired remote memory. Assessment/Plan      1. Closed fracture of left hip with delayed healing, subsequent encounter - Chronic and denies pain. Continue current pain medications. On hospice now. 2. Mass of left lung - on hospice and continue comfort medications. 3. Late onset Alzheimer's disease with behavioral disturbance ([facility] - Weight continues to decline. Continue to monitor weights which is down this last month. Continue to support. No current medications. 4. Moderate episode of recurrent major depressive disorder (HCC) - No current medications. Continue to monitor. 5. Essential hypertension - Blood pressures overall stable. No current medications. 6. Seizure disorder ([facility] - No recent seizures. Continue Keppra. 7. Primary osteoarthritis involving multiple joints - Recent fall with fracture. Continue Tylenol and Norco.    8. Malignant neoplasm of urinary bladder, unspecified site ([facility] - No s/s of infection or hematuria. Continue to support. Resident has OA, HTN, AD, Seizure disorder and it is expected to last 12 or more months. These chronic conditions place resident at a significant risk of death, acute exacerbation, or functional decline. The patient's comprehensive plan was monitored today. I spent 20 minutes reviewing plan.        Electronically signed by Darryl Melara MD on 3/19/2021 at 11:47 AM

## 2021-04-19 NOTE — PROGRESS NOTES
Michael Bailey is a 80 y.o. female who presents today for her medical conditions/complaints as noted below. Chief Complaint   Patient presents with    1 Month Follow-Up     Follow up on chronic health conditions           HPI:     Rosa Elena Mcgrath is seen today for follow up on chronic health conditions. She is up in her chair and is smiling today. She denies pain. She has been started on ABHR (Ativan, Benadryl, Haldol, Reglan) gel to assist with behaviors and agitation. This seems to be helping with care and decreased agitation per nursing report. No recent falls and her weight is up 4 pounds in the last month. She remains on hospice. She has chronic conditions of dementia, depression, history of bladder cancer, osteoarthritis, and recent hip fracture without repair. No recent labs. She is currently only on pain medication and the ABHR gel in addition to Tylenol and Robitussin as needed.       Past Medical History:   Diagnosis Date    Anemia, unspecified     Anxiety disorder, unspecified     Cardiac murmur, unspecified     Chronic obstructive pulmonary disease (COPD) (HCC)     Difficulty in walking, not elsewhere classified     Essential (primary) hypertension     Gastro-esophageal reflux disease without esophagitis     Hypertension     Major depressive disorder, recurrent ([address]     Malignant neoplasm of bladder (HCC)     Muscle weakness (generalized)     Other myocardial infarction type ([address]     Other seizures ([address]     Personal history of peptic ulcer disease     Pneumonia, unspecified organism     Primary generalized (osteo)arthritis     Retention of urine, unspecified     Stomach ulcer     Urinary tract infection, site not specified       Past Surgical History:   Procedure Laterality Date    APPENDECTOMY      BLADDER TUMOR EXCISION      Bladder tumor resection    COLONOSCOPY      HYSTERECTOMY      MIDDLE EAR SURGERY Left     PAROTIDECTOMY  5/15/10    Left total    SALPINGO-OOPHORECTOMY      SMALL INTESTINE SURGERY         Family History   Problem Relation Age of Onset    Diabetes Other     Hypertension Other     Thyroid Disease Other     Cancer Mother     Diabetes type 2  Father        Social History     Tobacco Use    Smoking status: Former Smoker     Types: Cigarettes    Smokeless tobacco: Never Used   Substance Use Topics    Alcohol use: No      Allergies   Allergen Reactions    Amoxicillin     Sulfa Antibiotics        Health Maintenance   Topic Date Due    COVID-19 Vaccine (1) Never done    DTaP/Tdap/Td vaccine (1 - Tdap) Never done    Shingles Vaccine (1 of 2) Never done    Pneumococcal 65+ years Vaccine (1 of 1 - PPSV23) Never done    Flu vaccine (Season Ended) 09/01/2021    Potassium monitoring  02/21/2022    Creatinine monitoring  02/21/2022    Hepatitis A vaccine  Aged Out    Hepatitis B vaccine  Aged Out    Hib vaccine  Aged Out    Meningococcal (ACWY) vaccine  Aged Out       Subjective:      Review of Systems   Unable to perform ROS: Dementia       Objective:     Physical Exam  Vitals signs and nursing note reviewed. Exam conducted with a chaperone present. Constitutional:       General: She is not in acute distress. Appearance: Normal appearance. She is not ill-appearing. HENT:      Head: Normocephalic and atraumatic. Right Ear: Decreased hearing noted. Left Ear: Decreased hearing noted. Nose: Nose normal.   Eyes:      General: Lids are normal.   Neck:      Musculoskeletal: Normal range of motion and neck supple. Cardiovascular:      Rate and Rhythm: Normal rate and regular rhythm. Heart sounds: S1 normal and S2 normal. Murmur present. Pulmonary:      Effort: Pulmonary effort is normal. No tachypnea or bradypnea. Breath sounds: Normal breath sounds. Abdominal:      General: Abdomen is flat. Bowel sounds are normal.      Palpations: Abdomen is soft. Tenderness: There is no abdominal tenderness. Musculoskeletal:      Right hip: She exhibits decreased range of motion and decreased strength. Left hip: She exhibits decreased range of motion and decreased strength. Right lower leg: No edema. Left lower leg: No edema. Skin:     General: Skin is warm and dry. Neurological:      Mental Status: She is alert. Mental status is at baseline. She is disoriented. Psychiatric:         Mood and Affect: Mood normal.         Speech: Speech is delayed. Cognition and Memory: Memory is impaired. She exhibits impaired recent memory and impaired remote memory. /67   Pulse 98   Temp 97.5 °F (36.4 °C)   Resp 16   Wt 105 lb 6.4 oz (47.8 kg)   SpO2 92%   BMI 17.54 kg/m²     Assessment/Plan      1. Closed fracture of left hip with delayed healing, subsequent encounter -continue pain management. Continue to monitor for pain. Is Cayla lift for transfers. 2. Mass of left lung -chronic and continue supportive care. .  Remains on hospice care. 3. Late onset Alzheimer's disease with behavioral disturbance ([facility] -recently started on compounded ABHR gel. Remains appropriate and somewhat improved with this. Continue to monitor. 4. Moderate episode of recurrent major depressive disorder (HCC) -mood stable today. Continue current compounded gel for agitation and restlessness. 5. Hospice care -continue supportive care. Resident has MDD, AD, OA, HTN, hx of bladder cancer and it is expected to last 12 or more months. These chronic conditions place resident at a significant risk of death, acute exacerbation, or functional decline. The patient's comprehensive plan was monitored today. I spent 20 minutes reviewing plan. Patient seen and examined. History partially obtained by chart review and nursing notes. I have reviewed patient's past medical, surgical, social, and family history and have made updates where appropriate.   See facility EMR for updated medication list. Electronically signed by BRENDEN Cassidy CNP on 4/19/2021 at 9:23 AM

## 2021-05-13 NOTE — PROGRESS NOTES
Hang Yu is a 80 y.o. female who presents today for her medical conditions/complaints as noted below. Chief Complaint   Patient presents with    1 Month Follow-Up     Follow up on chronic health conditions           HPI:     Karen today in her room. She is seen for follow-up on her chronic health conditions including dementia, depression, arthritis, history of bladder cancer, GERD, history of hip fracture. She has had 2 falls in the last month with no injury. She does have chronic behaviors of resistance of care and agitation. Her weight is stable and up 5 pounds in the last month. She does not answer questions appropriately during interview. She is hard of hearing. She is hitting her table with her remote control when this provider enters room and will question if she is okay she states yes.       Past Medical History:   Diagnosis Date    Anemia, unspecified     Anxiety disorder, unspecified     Cardiac murmur, unspecified     Chronic obstructive pulmonary disease (COPD) (HCC)     Difficulty in walking, not elsewhere classified     Essential (primary) hypertension     Gastro-esophageal reflux disease without esophagitis     Hypertension     Major depressive disorder, recurrent ([address]     Malignant neoplasm of bladder (HCC)     Muscle weakness (generalized)     Other myocardial infarction type ([address]     Other seizures ([address]     Personal history of peptic ulcer disease     Pneumonia, unspecified organism     Primary generalized (osteo)arthritis     Retention of urine, unspecified     Stomach ulcer     Urinary tract infection, site not specified       Past Surgical History:   Procedure Laterality Date    APPENDECTOMY      BLADDER TUMOR EXCISION      Bladder tumor resection    COLONOSCOPY      HYSTERECTOMY      MIDDLE EAR SURGERY Left     PAROTIDECTOMY  5/15/10    Left total    SALPINGO-OOPHORECTOMY      SMALL INTESTINE SURGERY         Family History   Problem Relation Age of Onset    Diabetes Other     Hypertension Other     Thyroid Disease Other     Cancer Mother     Diabetes type 2  Father        Social History     Tobacco Use    Smoking status: Former Smoker     Types: Cigarettes    Smokeless tobacco: Never Used   Substance Use Topics    Alcohol use: No      Allergies   Allergen Reactions    Amoxicillin     Sulfa Antibiotics        Health Maintenance   Topic Date Due    COVID-19 Vaccine (1) Never done    DTaP/Tdap/Td vaccine (1 - Tdap) Never done    Shingles Vaccine (1 of 2) Never done    Pneumococcal 65+ years Vaccine (1 of 1 - PPSV23) Never done    Flu vaccine (Season Ended) 09/01/2021    Potassium monitoring  02/21/2022    Creatinine monitoring  02/21/2022    Hepatitis A vaccine  Aged Out    Hepatitis B vaccine  Aged Out    Hib vaccine  Aged Out    Meningococcal (ACWY) vaccine  Aged Out       Subjective:      Review of Systems   Unable to perform ROS: Dementia       Objective:     Physical Exam  Vitals signs and nursing note reviewed. Exam conducted with a chaperone present. Constitutional:       General: She is not in acute distress. Appearance: Normal appearance. She is ill-appearing. HENT:      Head: Normocephalic and atraumatic. Right Ear: Hearing normal.      Left Ear: Hearing normal.      Nose: Nose normal.   Eyes:      General: Lids are normal.   Neck:      Musculoskeletal: Normal range of motion and neck supple. Cardiovascular:      Rate and Rhythm: Normal rate. Rhythm irregular. Heart sounds: S1 normal and S2 normal. Murmur present. Pulmonary:      Effort: Pulmonary effort is normal. No tachypnea or bradypnea. Breath sounds: Normal breath sounds. Abdominal:      General: Abdomen is flat. Bowel sounds are normal.      Palpations: Abdomen is soft. Tenderness: There is no abdominal tenderness. Musculoskeletal:      Right lower leg: No edema. Left lower leg: No edema. Skin:     General: Skin is warm and dry. Neurological:      Mental Status: She is alert. Mental status is at baseline. She is disoriented. Motor: Weakness present. Psychiatric:         Mood and Affect: Affect is blunt. Speech: Speech is delayed. Cognition and Memory: Memory is impaired. She exhibits impaired recent memory and impaired remote memory. BP (!) 147/79   Pulse 90   Temp 97.1 °F (36.2 °C)   Resp 18   Wt 105 lb 6.4 oz (47.8 kg)   SpO2 93%   BMI 17.54 kg/m²     Assessment/Plan      1. Closed fracture of left hip with delayed healing, subsequent encounter -chronic and continue Norco and Tylenol for pain. Denies any pain today. 2. Mass of left lung -remains on hospice. No signs and symptoms of shortness of breath. 3. Late onset Alzheimer's disease with behavioral disturbance (HCC) -chronic and continue  ABHR gel. Continue to monitor. 4. Moderate episode of recurrent major depressive disorder (HCC) -continue ABHR gel. 5. Hospice care - Continue to support     Resident has OA, AD, MDD, HTN, bladder cancer, lung mass and it is expected to last 12 or more months. These chronic conditions place resident at a significant risk of death, acute exacerbation, or functional decline. The patient's comprehensive plan was monitored today. I spent 20 minutes reviewing plan. Patient seen and examined. History partially obtained by chart review and nursing notes. I have reviewed patient's past medical, surgical, social, and family history and have made updates where appropriate.   See facility EMR for updated medication list.       Electronically signed by BRENDEN Gordon CNP on 5/13/2021 at 8:21 AM

## 2021-06-10 NOTE — PROGRESS NOTES
Alba Lou is a 80 y.o. female who presents today for her medical conditions/complaints as noted below. Chief Complaint   Patient presents with    1 Month Follow-Up     F/u on chronic health conditions           HPI:     Patient seen and examined in her room and she is up sitting in her chair. She has chronic health condition of dementia, depression, arthritis, history of bladder cancer, GERD, history of hip fracture. She is smiling and mc not answer questions appropriately. She has had no falls or labs. She remains on hospice care. She has comfort meds.        Past Medical History:   Diagnosis Date    Anemia, unspecified     Anxiety disorder, unspecified     Cardiac murmur, unspecified     Chronic obstructive pulmonary disease (COPD) (HCC)     Difficulty in walking, not elsewhere classified     Essential (primary) hypertension     Gastro-esophageal reflux disease without esophagitis     Hypertension     Major depressive disorder, recurrent ([address]     Malignant neoplasm of bladder (HCC)     Muscle weakness (generalized)     Other myocardial infarction type ([address]     Other seizures ([address]     Personal history of peptic ulcer disease     Pneumonia, unspecified organism     Primary generalized (osteo)arthritis     Retention of urine, unspecified     Stomach ulcer     Urinary tract infection, site not specified       Past Surgical History:   Procedure Laterality Date    APPENDECTOMY      BLADDER TUMOR EXCISION      Bladder tumor resection    COLONOSCOPY      HYSTERECTOMY      MIDDLE EAR SURGERY Left     PAROTIDECTOMY  5/15/10    Left total    SALPINGO-OOPHORECTOMY      SMALL INTESTINE SURGERY         Family History   Problem Relation Age of Onset    Diabetes Other     Hypertension Other     Thyroid Disease Other     Cancer Mother     Diabetes type 2  Father        Social History     Tobacco Use    Smoking status: Former Smoker     Types: Cigarettes    Smokeless tobacco: Never Used   Substance Use Topics    Alcohol use: No      Allergies   Allergen Reactions    Amoxicillin     Sulfa Antibiotics        Health Maintenance   Topic Date Due    COVID-19 Vaccine (1) Never done    DTaP/Tdap/Td vaccine (1 - Tdap) Never done    Shingles Vaccine (1 of 2) Never done    Pneumococcal 65+ years Vaccine (1 of 1 - PPSV23) Never done    Flu vaccine (Season Ended) 09/01/2021    Potassium monitoring  02/21/2022    Creatinine monitoring  02/21/2022    Hepatitis A vaccine  Aged Out    Hepatitis B vaccine  Aged Out    Hib vaccine  Aged Out    Meningococcal (ACWY) vaccine  Aged Out       Subjective:      Review of Systems   Unable to perform ROS: Dementia       Objective:     Physical Exam  Vitals and nursing note reviewed. Exam conducted with a chaperone present. Constitutional:       General: She is not in acute distress. Appearance: Normal appearance. She is not ill-appearing. HENT:      Head: Normocephalic and atraumatic. Right Ear: Hearing normal.      Left Ear: Hearing normal.      Nose: Nose normal.   Eyes:      General: Lids are normal.   Cardiovascular:      Rate and Rhythm: Normal rate and regular rhythm. Heart sounds: Normal heart sounds, S1 normal and S2 normal.   Pulmonary:      Effort: Pulmonary effort is normal. No tachypnea or bradypnea. Breath sounds: Normal breath sounds. Abdominal:      General: Abdomen is flat. Bowel sounds are normal.      Palpations: Abdomen is soft. Tenderness: There is no abdominal tenderness. Musculoskeletal:      Cervical back: Normal range of motion and neck supple. Right lower leg: No edema. Left lower leg: No edema. Skin:     General: Skin is warm and dry. Neurological:      Mental Status: She is alert. Mental status is at baseline. She is disoriented. Psychiatric:         Speech: Speech is delayed. Behavior: Behavior is agitated. Cognition and Memory: Memory is impaired.  She exhibits

## 2021-07-02 NOTE — PROGRESS NOTES
Mono Corbett is a 80 y.o. female who presents today for her medical conditions/complaints as noted below. Chief Complaint   Patient presents with    1 Month Follow-Up     Follow up on chronic health conditions           HPI:     Keyshawn Lawrence is seen today for follow up on chronic health conditions for follow up on chronic health conditions. She is up sitting in the recliner and in non-conversant. She has chronic health conditions of dementia, depression, arthritis, history of bladder cancer, GERD, history of hip fracture. Weight is down 13 pounds in the last month and her intakes are poor. She has had no falls in the last month. She has chronic behaviors of agitation, throwing things and aggression during care.            Past Medical History:   Diagnosis Date    Anemia, unspecified     Anxiety disorder, unspecified     Cardiac murmur, unspecified     Chronic obstructive pulmonary disease (COPD) (HCC)     Difficulty in walking, not elsewhere classified     Essential (primary) hypertension     Gastro-esophageal reflux disease without esophagitis     Hypertension     Major depressive disorder, recurrent ([address]     Malignant neoplasm of bladder (HCC)     Muscle weakness (generalized)     Other myocardial infarction type ([address]     Other seizures ([address]     Personal history of peptic ulcer disease     Pneumonia, unspecified organism     Primary generalized (osteo)arthritis     Retention of urine, unspecified     Stomach ulcer     Urinary tract infection, site not specified       Past Surgical History:   Procedure Laterality Date    APPENDECTOMY      BLADDER TUMOR EXCISION      Bladder tumor resection    COLONOSCOPY      HYSTERECTOMY      MIDDLE EAR SURGERY Left     PAROTIDECTOMY  5/15/10    Left total    SALPINGO-OOPHORECTOMY      SMALL INTESTINE SURGERY         Family History   Problem Relation Age of Onset    Diabetes Other     Hypertension Other     Thyroid Disease Other     Cancer Mother  Diabetes type 2  Father        Social History     Tobacco Use    Smoking status: Former Smoker     Types: Cigarettes    Smokeless tobacco: Never Used   Substance Use Topics    Alcohol use: No      Allergies   Allergen Reactions    Amoxicillin     Sulfa Antibiotics        Health Maintenance   Topic Date Due    COVID-19 Vaccine (1) Never done    DTaP/Tdap/Td vaccine (1 - Tdap) Never done    Shingles Vaccine (1 of 2) Never done    Pneumococcal 65+ years Vaccine (1 of 1 - PPSV23) Never done    Flu vaccine (1) 09/01/2021    Potassium monitoring  02/21/2022    Creatinine monitoring  02/21/2022    Hepatitis A vaccine  Aged Out    Hepatitis B vaccine  Aged Out    Hib vaccine  Aged Out    Meningococcal (ACWY) vaccine  Aged Out       Subjective:      Review of Systems   Unable to perform ROS: Dementia       Objective:     Physical Exam  Vitals and nursing note reviewed. Exam conducted with a chaperone present. Constitutional:       General: She is sleeping. She is not in acute distress. Appearance: Normal appearance. She is well-groomed. She is cachectic. She is ill-appearing. HENT:      Head: Normocephalic and atraumatic. Right Ear: Hearing normal.      Left Ear: Hearing normal.      Nose: Nose normal.   Eyes:      General: Lids are normal.   Cardiovascular:      Rate and Rhythm: Normal rate and regular rhythm. Heart sounds: Normal heart sounds, S1 normal and S2 normal.   Pulmonary:      Effort: Pulmonary effort is normal. No tachypnea or bradypnea. Breath sounds: Normal breath sounds. Abdominal:      General: Abdomen is flat. Bowel sounds are normal.      Palpations: Abdomen is soft. Tenderness: There is no abdominal tenderness. Musculoskeletal:      Cervical back: Normal range of motion and neck supple. Right lower leg: No edema. Left lower leg: No edema. Skin:     General: Skin is warm and dry. Neurological:      Mental Status: She is easily aroused.  Mental status is at baseline. She is disoriented. Psychiatric:         Mood and Affect: Affect is inappropriate. Behavior: Behavior is agitated (at times), withdrawn and combative (at times). Cognition and Memory: Memory is impaired. She exhibits impaired recent memory and impaired remote memory. BP (!) 112/50   Pulse 90   Temp 97.9 °F (36.6 °C)   Resp 16   Wt 90 lb 9.6 oz (41.1 kg)   SpO2 94%   BMI 15.08 kg/m²     Assessment/Plan      1. Closed fracture of left hip with delayed healing, subsequent encounter - chronic and continue Tylenol. Monitor for falls. 2. Mass of left lung - Chronic and continue to monitor. Supportive care with hospice. 3. Late onset Alzheimer's disease with behavioral disturbance (HCC) - Chronic and and continue ABHR gel. Weight is down 13 pounds in the last month. 4. Moderate episode of recurrent major depressive disorder ([address] - is aggressive at time especially with care. Continue ABHR gel and monitor. 5. Hospice care - Continue to support. Resident has OA, AD, MDD, HTN, bladder cancer, lung mass and it is expected to last 12 or more months. These chronic conditions place resident at a significant risk of death, acute exacerbation, or functional decline. The patient's comprehensive plan was monitored today. I spent 20 minutes reviewing plan. Patient seen and examined. History partially obtained by chart review and nursing notes. I have reviewed patient's past medical, surgical, social, and family history and have made updates where appropriate.   See facility EMR for updated medication list.       Electronically signed by Nayely Schafer MD on 7/2/2021 at 1:23 PM

## 2021-07-12 ENCOUNTER — TELEPHONE (OUTPATIENT)
Dept: FAMILY MEDICINE CLINIC | Age: 86
End: 2021-07-12

## 2021-07-12 NOTE — TELEPHONE ENCOUNTER
----- Message from Samantha Agosto sent at 7/12/2021  1:37 PM EDT -----  Subject: Message to Provider    QUESTIONS  Information for Provider? please call ernie to verify you got the death   certificate for seymour  ---------------------------------------------------------------------------  --------------  CALL BACK INFO  What is the best way for the office to contact you? OK to leave message on   voicemail  Preferred Call Back Phone Number? [phone]  ---------------------------------------------------------------------------  --------------  SCRIPT ANSWERS  Relationship to Patient?  Third Party  Representative Name? Kaye Cruz at [facility]